# Patient Record
Sex: FEMALE | Race: WHITE | NOT HISPANIC OR LATINO
[De-identification: names, ages, dates, MRNs, and addresses within clinical notes are randomized per-mention and may not be internally consistent; named-entity substitution may affect disease eponyms.]

---

## 2017-11-15 ENCOUNTER — APPOINTMENT (OUTPATIENT)
Dept: COLORECTAL SURGERY | Facility: CLINIC | Age: 62
End: 2017-11-15
Payer: COMMERCIAL

## 2017-11-15 VITALS
BODY MASS INDEX: 45.04 KG/M2 | DIASTOLIC BLOOD PRESSURE: 79 MMHG | HEART RATE: 80 BPM | WEIGHT: 287 LBS | SYSTOLIC BLOOD PRESSURE: 131 MMHG | RESPIRATION RATE: 14 BRPM | HEIGHT: 67 IN

## 2017-11-15 DIAGNOSIS — Z87.891 PERSONAL HISTORY OF NICOTINE DEPENDENCE: ICD-10-CM

## 2017-11-15 DIAGNOSIS — Z85.828 PERSONAL HISTORY OF OTHER MALIGNANT NEOPLASM OF SKIN: ICD-10-CM

## 2017-11-15 DIAGNOSIS — Z86.39 PERSONAL HISTORY OF OTHER ENDOCRINE, NUTRITIONAL AND METABOLIC DISEASE: ICD-10-CM

## 2017-11-15 DIAGNOSIS — Z83.79 FAMILY HISTORY OF OTHER DISEASES OF THE DIGESTIVE SYSTEM: ICD-10-CM

## 2017-11-15 DIAGNOSIS — Z80.0 FAMILY HISTORY OF MALIGNANT NEOPLASM OF DIGESTIVE ORGANS: ICD-10-CM

## 2017-11-15 DIAGNOSIS — Z86.010 PERSONAL HISTORY OF COLONIC POLYPS: ICD-10-CM

## 2017-11-15 DIAGNOSIS — Z86.79 PERSONAL HISTORY OF OTHER DISEASES OF THE CIRCULATORY SYSTEM: ICD-10-CM

## 2017-11-15 DIAGNOSIS — Z80.49 FAMILY HISTORY OF MALIGNANT NEOPLASM OF OTHER GENITAL ORGANS: ICD-10-CM

## 2017-11-15 PROCEDURE — 99244 OFF/OP CNSLTJ NEW/EST MOD 40: CPT

## 2017-11-15 RX ORDER — POLYETHYLENE GLYCOL 3350, SODIUM SULFATE, SODIUM CHLORIDE, POTASSIUM CHLORIDE, ASCORBIC ACID, SODIUM ASCORBATE 7.5-2.691G
100 KIT ORAL
Qty: 1 | Refills: 0 | Status: ACTIVE | COMMUNITY
Start: 2017-11-15 | End: 1900-01-01

## 2017-11-16 PROBLEM — Z86.79 HISTORY OF HYPERTENSION: Status: RESOLVED | Noted: 2017-11-16 | Resolved: 2017-11-16

## 2017-11-16 PROBLEM — Z85.828 HISTORY OF MALIGNANT NEOPLASM OF SKIN: Status: RESOLVED | Noted: 2017-11-14 | Resolved: 2017-11-16

## 2017-11-16 PROBLEM — Z86.79 HISTORY OF VARICOSE VEINS: Status: RESOLVED | Noted: 2017-11-14 | Resolved: 2017-11-16

## 2017-11-16 PROBLEM — Z87.891 FORMER SMOKER: Status: ACTIVE | Noted: 2017-11-16

## 2017-11-16 PROBLEM — Z83.79 FAMILY HISTORY OF CROHN'S DISEASE: Status: ACTIVE | Noted: 2017-11-14

## 2017-11-16 PROBLEM — Z86.39 HISTORY OF HYPERLIPIDEMIA: Status: RESOLVED | Noted: 2017-11-14 | Resolved: 2017-11-16

## 2017-11-16 PROBLEM — Z80.49 FAMILY HISTORY OF MALIGNANT NEOPLASM OF FEMALE GENITAL ORGAN: Status: ACTIVE | Noted: 2017-11-16

## 2017-11-16 PROBLEM — Z86.39 HISTORY OF DIABETES MELLITUS: Status: RESOLVED | Noted: 2017-11-16 | Resolved: 2017-11-16

## 2017-11-16 PROBLEM — Z86.010 HISTORY OF COLON POLYPS: Status: ACTIVE | Noted: 2017-11-16

## 2017-11-16 PROBLEM — Z86.010 HISTORY OF COLON POLYPS: Status: RESOLVED | Noted: 2017-11-14 | Resolved: 2017-11-16

## 2017-11-28 ENCOUNTER — APPOINTMENT (OUTPATIENT)
Dept: COLORECTAL SURGERY | Facility: CLINIC | Age: 62
End: 2017-11-28
Payer: COMMERCIAL

## 2017-11-28 PROCEDURE — 45385 COLONOSCOPY W/LESION REMOVAL: CPT

## 2017-12-26 ENCOUNTER — TRANSCRIPTION ENCOUNTER (OUTPATIENT)
Age: 62
End: 2017-12-26

## 2018-02-22 ENCOUNTER — OTHER (OUTPATIENT)
Age: 63
End: 2018-02-22

## 2018-07-26 PROBLEM — Z80.0 FAMILY HISTORY OF COLON CANCER: Status: ACTIVE | Noted: 2017-11-14

## 2019-04-15 ENCOUNTER — TRANSCRIPTION ENCOUNTER (OUTPATIENT)
Age: 64
End: 2019-04-15

## 2020-09-01 ENCOUNTER — APPOINTMENT (OUTPATIENT)
Dept: COLORECTAL SURGERY | Facility: CLINIC | Age: 65
End: 2020-09-01
Payer: COMMERCIAL

## 2020-09-01 VITALS
HEART RATE: 103 BPM | SYSTOLIC BLOOD PRESSURE: 108 MMHG | DIASTOLIC BLOOD PRESSURE: 73 MMHG | WEIGHT: 240 LBS | BODY MASS INDEX: 37.67 KG/M2 | TEMPERATURE: 98.2 F | RESPIRATION RATE: 14 BRPM | HEIGHT: 67 IN

## 2020-09-01 DIAGNOSIS — R19.4 CHANGE IN BOWEL HABIT: ICD-10-CM

## 2020-09-01 DIAGNOSIS — H91.91 UNSPECIFIED HEARING LOSS, RIGHT EAR: ICD-10-CM

## 2020-09-01 DIAGNOSIS — Z86.69 PERSONAL HISTORY OF OTHER DISEASES OF THE NERVOUS SYSTEM AND SENSE ORGANS: ICD-10-CM

## 2020-09-01 DIAGNOSIS — Z00.00 ENCOUNTER FOR GENERAL ADULT MEDICAL EXAMINATION W/OUT ABNORMAL FINDINGS: ICD-10-CM

## 2020-09-01 PROCEDURE — 99214 OFFICE O/P EST MOD 30 MIN: CPT

## 2020-09-02 RX ORDER — LOSARTAN POTASSIUM 25 MG/1
25 TABLET, FILM COATED ORAL
Qty: 30 | Refills: 0 | Status: ACTIVE | COMMUNITY
Start: 2020-06-19

## 2020-09-02 RX ORDER — CLOTRIMAZOLE AND BETAMETHASONE DIPROPIONATE 10; .5 MG/G; MG/G
1-0.05 CREAM TOPICAL
Qty: 45 | Refills: 0 | Status: ACTIVE | COMMUNITY
Start: 2020-03-02

## 2020-09-02 RX ORDER — HYDROCORTISONE 25 MG/G
2.5 CREAM TOPICAL TWICE DAILY
Qty: 1 | Refills: 4 | Status: ACTIVE | COMMUNITY
Start: 2020-09-02 | End: 1900-01-01

## 2020-09-02 RX ORDER — ANTIFUNGAL POWDER MOISTURE ABSORBING 1.42 G/71G
2 POWDER TOPICAL
Qty: 71 | Refills: 0 | Status: ACTIVE | COMMUNITY
Start: 2020-02-22

## 2020-09-04 DIAGNOSIS — A04.72 ENTEROCOLITIS DUE TO CLOSTRIDIUM DIFFICILE, NOT SPECIFIED AS RECURRENT: ICD-10-CM

## 2020-09-04 PROBLEM — Z86.69 HISTORY OF HEARING LOSS: Status: RESOLVED | Noted: 2020-09-03 | Resolved: 2020-09-04

## 2020-09-04 PROBLEM — R19.4 CHANGE IN BOWEL HABITS: Status: ACTIVE | Noted: 2020-09-04

## 2020-09-04 PROBLEM — H91.91 HEARING LOSS OF RIGHT EAR, UNSPECIFIED HEARING LOSS TYPE: Status: RESOLVED | Noted: 2020-09-03 | Resolved: 2020-09-04

## 2020-09-04 NOTE — PHYSICAL EXAM
[Abdomen Tenderness] : ~T No ~M abdominal tenderness [Abdomen Masses] : No abdominal masses [JVD] : no jugular venous distention  [Tender] : nontender [Normal Heart Sounds] : normal heart sounds [Normal Breath Sounds] : Normal breath sounds [Normal Rate and Rhythm] : normal rate and rhythm [No Rash or Lesion] : No rash or lesion [Alert] : alert [Oriented to Place] : oriented to place [Oriented to Person] : oriented to person [Oriented to Time] : oriented to time [de-identified] : Looks well in no distress, of stated age. [Calm] : calm [de-identified] : pupils equal reactive to light normocephalic atraumatic. [de-identified] : moves all 4 extremities appropriately with 5 over 5 strength

## 2020-09-04 NOTE — REVIEW OF SYSTEMS
[Feeling Poorly] : feeling poorly [Abdominal Pain] : abdominal pain [As Noted in HPI] : as noted in HPI [Diarrhea] : diarrhea [Negative] : Endocrine

## 2020-09-04 NOTE — ASSESSMENT
[FreeTextEntry1] : 65-year-old female with history of colon polyps and family history of colon cancer With recent changes in bowel habits with increasing diarrhea and bleeding. Recommend colonoscopy, risk and benefits of colonoscopy have been discussed which include but not limited to bleeding, perforation, missing cancer or polyp occurring 5%. Recommend weight reduction high fiber diet Metamucil daily 81 mg aspirin daily to reduce colon polyp formation

## 2020-09-04 NOTE — CONSULT LETTER
[Consult Closing:] : Thank you very much for allowing me to participate in the care of this patient.  If you have any questions, please do not hesitate to contact me. [Please see my note below.] : Please see my note below.

## 2020-09-04 NOTE — HISTORY OF PRESENT ILLNESS
[FreeTextEntry1] : 65-year-old female with previous history of colon polyps and strong family history of colon cancer here With new complaints or changes in bowel habits with diarrhea and bleeding. Symptoms have been worsening

## 2020-09-14 RX ORDER — METRONIDAZOLE 500 MG/1
500 TABLET ORAL
Qty: 30 | Refills: 0 | Status: ACTIVE | COMMUNITY
Start: 2020-09-04 | End: 1900-01-01

## 2020-10-06 ENCOUNTER — APPOINTMENT (OUTPATIENT)
Dept: COLORECTAL SURGERY | Facility: CLINIC | Age: 65
End: 2020-10-06

## 2020-10-27 ENCOUNTER — APPOINTMENT (OUTPATIENT)
Dept: COLORECTAL SURGERY | Facility: CLINIC | Age: 65
End: 2020-10-27

## 2021-03-24 ENCOUNTER — OUTPATIENT (OUTPATIENT)
Dept: OUTPATIENT SERVICES | Facility: HOSPITAL | Age: 66
LOS: 1 days | End: 2021-03-24
Payer: COMMERCIAL

## 2021-03-24 ENCOUNTER — APPOINTMENT (OUTPATIENT)
Dept: CT IMAGING | Facility: CLINIC | Age: 66
End: 2021-03-24
Payer: COMMERCIAL

## 2021-03-24 DIAGNOSIS — Z00.8 ENCOUNTER FOR OTHER GENERAL EXAMINATION: ICD-10-CM

## 2021-03-24 PROCEDURE — 74261 CT COLONOGRAPHY DX: CPT

## 2021-03-24 PROCEDURE — 74261 CT COLONOGRAPHY DX: CPT | Mod: 26

## 2021-11-30 ENCOUNTER — APPOINTMENT (OUTPATIENT)
Dept: OPHTHALMOLOGY | Facility: CLINIC | Age: 66
End: 2021-11-30
Payer: COMMERCIAL

## 2021-11-30 ENCOUNTER — NON-APPOINTMENT (OUTPATIENT)
Age: 66
End: 2021-11-30

## 2021-11-30 PROCEDURE — 99213 OFFICE O/P EST LOW 20 MIN: CPT

## 2022-01-11 ENCOUNTER — RESULT REVIEW (OUTPATIENT)
Age: 67
End: 2022-01-11

## 2022-05-25 ENCOUNTER — NON-APPOINTMENT (OUTPATIENT)
Age: 67
End: 2022-05-25

## 2022-05-26 ENCOUNTER — APPOINTMENT (OUTPATIENT)
Dept: OPHTHALMOLOGY | Facility: CLINIC | Age: 67
End: 2022-05-26
Payer: COMMERCIAL

## 2022-05-26 ENCOUNTER — NON-APPOINTMENT (OUTPATIENT)
Age: 67
End: 2022-05-26

## 2022-05-26 PROCEDURE — 92014 COMPRE OPH EXAM EST PT 1/>: CPT

## 2022-12-08 ENCOUNTER — NON-APPOINTMENT (OUTPATIENT)
Age: 67
End: 2022-12-08

## 2022-12-08 ENCOUNTER — APPOINTMENT (OUTPATIENT)
Dept: OPHTHALMOLOGY | Facility: CLINIC | Age: 67
End: 2022-12-08

## 2022-12-08 PROCEDURE — 99213 OFFICE O/P EST LOW 20 MIN: CPT

## 2023-03-06 ENCOUNTER — NON-APPOINTMENT (OUTPATIENT)
Age: 68
End: 2023-03-06

## 2023-03-06 ENCOUNTER — APPOINTMENT (OUTPATIENT)
Dept: OPHTHALMOLOGY | Facility: CLINIC | Age: 68
End: 2023-03-06
Payer: MEDICARE

## 2023-03-06 PROCEDURE — 92012 INTRM OPH EXAM EST PATIENT: CPT

## 2023-08-03 ENCOUNTER — APPOINTMENT (OUTPATIENT)
Dept: OPHTHALMOLOGY | Facility: CLINIC | Age: 68
End: 2023-08-03
Payer: MEDICARE

## 2023-08-03 ENCOUNTER — NON-APPOINTMENT (OUTPATIENT)
Age: 68
End: 2023-08-03

## 2023-08-03 PROCEDURE — 92014 COMPRE OPH EXAM EST PT 1/>: CPT

## 2023-11-09 ENCOUNTER — NON-APPOINTMENT (OUTPATIENT)
Age: 68
End: 2023-11-09

## 2023-11-10 ENCOUNTER — NON-APPOINTMENT (OUTPATIENT)
Age: 68
End: 2023-11-10

## 2023-11-10 ENCOUNTER — APPOINTMENT (OUTPATIENT)
Dept: COLORECTAL SURGERY | Facility: CLINIC | Age: 68
End: 2023-11-10
Payer: MEDICARE

## 2023-11-10 VITALS
RESPIRATION RATE: 14 BRPM | HEART RATE: 64 BPM | BODY MASS INDEX: 37.35 KG/M2 | SYSTOLIC BLOOD PRESSURE: 147 MMHG | OXYGEN SATURATION: 96 % | WEIGHT: 238 LBS | DIASTOLIC BLOOD PRESSURE: 85 MMHG | HEIGHT: 67 IN

## 2023-11-10 DIAGNOSIS — K50.111 CROHN'S DISEASE OF LARGE INTESTINE WITH RECTAL BLEEDING: ICD-10-CM

## 2023-11-10 PROCEDURE — 99205 OFFICE O/P NEW HI 60 MIN: CPT

## 2023-11-30 ENCOUNTER — NON-APPOINTMENT (OUTPATIENT)
Age: 68
End: 2023-11-30

## 2023-12-08 ENCOUNTER — APPOINTMENT (OUTPATIENT)
Dept: OPHTHALMOLOGY | Facility: CLINIC | Age: 68
End: 2023-12-08
Payer: MEDICARE

## 2023-12-08 ENCOUNTER — NON-APPOINTMENT (OUTPATIENT)
Age: 68
End: 2023-12-08

## 2023-12-08 PROCEDURE — 92012 INTRM OPH EXAM EST PATIENT: CPT

## 2024-06-07 ENCOUNTER — APPOINTMENT (OUTPATIENT)
Dept: OPHTHALMOLOGY | Facility: CLINIC | Age: 69
End: 2024-06-07
Payer: MEDICARE

## 2024-06-07 ENCOUNTER — NON-APPOINTMENT (OUTPATIENT)
Age: 69
End: 2024-06-07

## 2024-06-07 PROCEDURE — 92250 FUNDUS PHOTOGRAPHY W/I&R: CPT

## 2024-06-07 PROCEDURE — 92014 COMPRE OPH EXAM EST PT 1/>: CPT

## 2024-09-17 ENCOUNTER — APPOINTMENT (OUTPATIENT)
Dept: COLORECTAL SURGERY | Facility: CLINIC | Age: 69
End: 2024-09-17
Payer: MEDICARE

## 2024-09-17 VITALS
DIASTOLIC BLOOD PRESSURE: 84 MMHG | BODY MASS INDEX: 34.21 KG/M2 | SYSTOLIC BLOOD PRESSURE: 128 MMHG | HEART RATE: 86 BPM | WEIGHT: 218 LBS | HEIGHT: 67 IN | RESPIRATION RATE: 14 BRPM

## 2024-09-17 DIAGNOSIS — K50.111 CROHN'S DISEASE OF LARGE INTESTINE WITH RECTAL BLEEDING: ICD-10-CM

## 2024-09-17 DIAGNOSIS — K56.699 OTHER INTESTINAL OBSTRUCTION UNSPECIFIED AS TO PARTIAL VERSUS COMPLETE OBSTRUCTION: ICD-10-CM

## 2024-09-17 PROCEDURE — 99214 OFFICE O/P EST MOD 30 MIN: CPT

## 2024-09-18 ENCOUNTER — NON-APPOINTMENT (OUTPATIENT)
Age: 69
End: 2024-09-18

## 2024-09-18 PROBLEM — K56.699 COLON STRICTURE: Status: ACTIVE | Noted: 2024-09-18

## 2024-09-18 NOTE — ASSESSMENT
[FreeTextEntry1] : 69year-old female with extensive Crohn's disease of the colon and rectum with stricture of the sigmoid colon..  Recommend Gastrografin enema study and CT enterography to assess area of stricture and active Crohn's disease.  Discussed the increased risk of cancer in patients with inflammatory bowel disease and stricture and she may require surgical resection of that area of the colon.  Risk and benefits of the surgery have been discussed which include bleeding, infection, sepsis, multiorgan failure, inadvertent injury including hollow viscus, solid organ, ureter neurovascular and neurological structures, DVT PE, heart attack, stroke, hernias, recurrence, leakage of anastomosis require reoperation possible stoma and death.

## 2024-09-18 NOTE — PHYSICAL EXAM
[Abdomen Masses] : No abdominal masses [Abdomen Tenderness] : ~T No ~M abdominal tenderness [JVD] : no jugular venous distention  [Normal Breath Sounds] : Normal breath sounds [Normal Heart Sounds] : normal heart sounds [Normal Rate and Rhythm] : normal rate and rhythm [No Rash or Lesion] : No rash or lesion [Alert] : alert [Oriented to Person] : oriented to person [Oriented to Place] : oriented to place [Oriented to Time] : oriented to time [Calm] : calm [de-identified] : Obese [de-identified] : Looks chronically ill in no distress [de-identified] : Pupils equal reactive to light normocephalic atraumatic. [de-identified] : Moves all 4 extremities appropriately with 5 over 5 strength

## 2024-09-18 NOTE — HISTORY OF PRESENT ILLNESS
[FreeTextEntry1] : 69-year-old female with severe Crohn's disease of the colon and rectum has undergone extensive medical treatment with Dr. Donovan Denney, although her symptoms have improved with recent changes in medication recent colonoscopy by Dr. Donovan Denney demonstrated significant stricture of the sigmoid colon

## 2024-09-18 NOTE — REVIEW OF SYSTEMS
[Feeling Poorly] : feeling poorly [Feeling Tired] : feeling tired [Recent Weight Loss (___ Lbs)] : recent [unfilled] ~Ulb weight loss [As Noted in HPI] : as noted in HPI [Abdominal Pain] : abdominal pain [Diarrhea] : diarrhea [Melena] : meljose de jesus [Negative] : Heme/Lymph

## 2024-10-02 ENCOUNTER — NON-APPOINTMENT (OUTPATIENT)
Age: 69
End: 2024-10-02

## 2024-10-02 ENCOUNTER — OUTPATIENT (OUTPATIENT)
Dept: OUTPATIENT SERVICES | Facility: HOSPITAL | Age: 69
LOS: 1 days | End: 2024-10-02
Payer: MEDICARE

## 2024-10-02 DIAGNOSIS — R19.4 CHANGE IN BOWEL HABIT: ICD-10-CM

## 2024-10-02 PROCEDURE — 74270 X-RAY XM COLON 1CNTRST STD: CPT | Mod: 26

## 2024-10-02 PROCEDURE — 74270 X-RAY XM COLON 1CNTRST STD: CPT

## 2024-10-03 DIAGNOSIS — R19.4 CHANGE IN BOWEL HABIT: ICD-10-CM

## 2024-10-22 ENCOUNTER — APPOINTMENT (OUTPATIENT)
Dept: COLORECTAL SURGERY | Facility: CLINIC | Age: 69
End: 2024-10-22
Payer: MEDICARE

## 2024-10-22 VITALS
DIASTOLIC BLOOD PRESSURE: 89 MMHG | HEART RATE: 98 BPM | SYSTOLIC BLOOD PRESSURE: 133 MMHG | RESPIRATION RATE: 14 BRPM | WEIGHT: 217 LBS | BODY MASS INDEX: 34.06 KG/M2 | HEIGHT: 67 IN

## 2024-10-22 DIAGNOSIS — K50.111 CROHN'S DISEASE OF LARGE INTESTINE WITH RECTAL BLEEDING: ICD-10-CM

## 2024-10-22 DIAGNOSIS — E66.01 MORBID (SEVERE) OBESITY DUE TO EXCESS CALORIES: ICD-10-CM

## 2024-10-22 DIAGNOSIS — K56.699 OTHER INTESTINAL OBSTRUCTION UNSPECIFIED AS TO PARTIAL VERSUS COMPLETE OBSTRUCTION: ICD-10-CM

## 2024-10-22 PROCEDURE — 99215 OFFICE O/P EST HI 40 MIN: CPT

## 2024-10-22 RX ORDER — METRONIDAZOLE 500 MG/1
500 TABLET ORAL
Qty: 6 | Refills: 0 | Status: ACTIVE | COMMUNITY
Start: 2024-10-22 | End: 1900-01-01

## 2024-10-22 RX ORDER — NEOMYCIN SULFATE 500 MG/1
500 TABLET ORAL
Qty: 6 | Refills: 0 | Status: ACTIVE | COMMUNITY
Start: 2024-10-22 | End: 1900-01-01

## 2024-11-05 ENCOUNTER — OUTPATIENT (OUTPATIENT)
Dept: OUTPATIENT SERVICES | Facility: HOSPITAL | Age: 69
LOS: 1 days | End: 2024-11-05
Payer: MEDICARE

## 2024-11-05 VITALS
HEIGHT: 65 IN | OXYGEN SATURATION: 97 % | TEMPERATURE: 99 F | DIASTOLIC BLOOD PRESSURE: 71 MMHG | WEIGHT: 218.26 LBS | HEART RATE: 80 BPM | RESPIRATION RATE: 16 BRPM | SYSTOLIC BLOOD PRESSURE: 131 MMHG

## 2024-11-05 DIAGNOSIS — Z98.890 OTHER SPECIFIED POSTPROCEDURAL STATES: Chronic | ICD-10-CM

## 2024-11-05 DIAGNOSIS — Z01.818 ENCOUNTER FOR OTHER PREPROCEDURAL EXAMINATION: ICD-10-CM

## 2024-11-05 DIAGNOSIS — Z29.9 ENCOUNTER FOR PROPHYLACTIC MEASURES, UNSPECIFIED: ICD-10-CM

## 2024-11-05 LAB
A1C WITH ESTIMATED AVERAGE GLUCOSE RESULT: 5.6 % — SIGNIFICANT CHANGE UP (ref 4–5.6)
ABO RH CONFIRMATION: SIGNIFICANT CHANGE UP
ALBUMIN SERPL ELPH-MCNC: 3.5 G/DL — SIGNIFICANT CHANGE UP (ref 3.3–5)
ALP SERPL-CCNC: 72 U/L — SIGNIFICANT CHANGE UP (ref 40–120)
ALT FLD-CCNC: 16 U/L — SIGNIFICANT CHANGE UP (ref 12–78)
ANION GAP SERPL CALC-SCNC: 4 MMOL/L — LOW (ref 5–17)
APTT BLD: 32.6 SEC — SIGNIFICANT CHANGE UP (ref 24.5–35.6)
AST SERPL-CCNC: 16 U/L — SIGNIFICANT CHANGE UP (ref 15–37)
BASOPHILS # BLD AUTO: 0.02 K/UL — SIGNIFICANT CHANGE UP (ref 0–0.2)
BASOPHILS NFR BLD AUTO: 0.3 % — SIGNIFICANT CHANGE UP (ref 0–2)
BILIRUB DIRECT SERPL-MCNC: 0.4 MG/DL — HIGH (ref 0–0.3)
BILIRUB INDIRECT FLD-MCNC: 1.5 MG/DL — HIGH (ref 0.2–1)
BILIRUB SERPL-MCNC: 1.9 MG/DL — HIGH (ref 0.2–1.2)
BLD GP AB SCN SERPL QL: SIGNIFICANT CHANGE UP
BUN SERPL-MCNC: 20 MG/DL — SIGNIFICANT CHANGE UP (ref 7–23)
CALCIUM SERPL-MCNC: 9.7 MG/DL — SIGNIFICANT CHANGE UP (ref 8.5–10.1)
CEA SERPL-MCNC: <0.6 NG/ML — SIGNIFICANT CHANGE UP (ref 0–3.8)
CHLORIDE SERPL-SCNC: 106 MMOL/L — SIGNIFICANT CHANGE UP (ref 96–108)
CO2 SERPL-SCNC: 29 MMOL/L — SIGNIFICANT CHANGE UP (ref 22–31)
CREAT SERPL-MCNC: 0.89 MG/DL — SIGNIFICANT CHANGE UP (ref 0.5–1.3)
EGFR: 71 ML/MIN/1.73M2 — SIGNIFICANT CHANGE UP
EOSINOPHIL # BLD AUTO: 0.12 K/UL — SIGNIFICANT CHANGE UP (ref 0–0.5)
EOSINOPHIL NFR BLD AUTO: 1.7 % — SIGNIFICANT CHANGE UP (ref 0–6)
ESTIMATED AVERAGE GLUCOSE: 114 MG/DL — SIGNIFICANT CHANGE UP (ref 68–114)
GLUCOSE SERPL-MCNC: 118 MG/DL — HIGH (ref 70–99)
HCT VFR BLD CALC: 40.5 % — SIGNIFICANT CHANGE UP (ref 34.5–45)
HGB BLD-MCNC: 13.6 G/DL — SIGNIFICANT CHANGE UP (ref 11.5–15.5)
IMM GRANULOCYTES NFR BLD AUTO: 0.3 % — SIGNIFICANT CHANGE UP (ref 0–0.9)
INR BLD: 1.03 RATIO — SIGNIFICANT CHANGE UP (ref 0.85–1.16)
LYMPHOCYTES # BLD AUTO: 2.3 K/UL — SIGNIFICANT CHANGE UP (ref 1–3.3)
LYMPHOCYTES # BLD AUTO: 32.9 % — SIGNIFICANT CHANGE UP (ref 13–44)
MCHC RBC-ENTMCNC: 31.1 PG — SIGNIFICANT CHANGE UP (ref 27–34)
MCHC RBC-ENTMCNC: 33.6 G/DL — SIGNIFICANT CHANGE UP (ref 32–36)
MCV RBC AUTO: 92.5 FL — SIGNIFICANT CHANGE UP (ref 80–100)
MONOCYTES # BLD AUTO: 0.71 K/UL — SIGNIFICANT CHANGE UP (ref 0–0.9)
MONOCYTES NFR BLD AUTO: 10.2 % — SIGNIFICANT CHANGE UP (ref 2–14)
NEUTROPHILS # BLD AUTO: 3.82 K/UL — SIGNIFICANT CHANGE UP (ref 1.8–7.4)
NEUTROPHILS NFR BLD AUTO: 54.6 % — SIGNIFICANT CHANGE UP (ref 43–77)
PLATELET # BLD AUTO: 269 K/UL — SIGNIFICANT CHANGE UP (ref 150–400)
POTASSIUM SERPL-MCNC: 3.6 MMOL/L — SIGNIFICANT CHANGE UP (ref 3.5–5.3)
POTASSIUM SERPL-SCNC: 3.6 MMOL/L — SIGNIFICANT CHANGE UP (ref 3.5–5.3)
PROT SERPL-MCNC: 7.3 GM/DL — SIGNIFICANT CHANGE UP (ref 6–8.3)
PROTHROM AB SERPL-ACNC: 11.8 SEC — SIGNIFICANT CHANGE UP (ref 9.9–13.4)
RBC # BLD: 4.38 M/UL — SIGNIFICANT CHANGE UP (ref 3.8–5.2)
RBC # FLD: 12.3 % — SIGNIFICANT CHANGE UP (ref 10.3–14.5)
SODIUM SERPL-SCNC: 139 MMOL/L — SIGNIFICANT CHANGE UP (ref 135–145)
WBC # BLD: 6.99 K/UL — SIGNIFICANT CHANGE UP (ref 3.8–10.5)
WBC # FLD AUTO: 6.99 K/UL — SIGNIFICANT CHANGE UP (ref 3.8–10.5)

## 2024-11-05 PROCEDURE — 83036 HEMOGLOBIN GLYCOSYLATED A1C: CPT

## 2024-11-05 PROCEDURE — 82378 CARCINOEMBRYONIC ANTIGEN: CPT

## 2024-11-05 PROCEDURE — 36415 COLL VENOUS BLD VENIPUNCTURE: CPT

## 2024-11-05 PROCEDURE — 80048 BASIC METABOLIC PNL TOTAL CA: CPT

## 2024-11-05 PROCEDURE — 86901 BLOOD TYPING SEROLOGIC RH(D): CPT

## 2024-11-05 PROCEDURE — 85025 COMPLETE CBC W/AUTO DIFF WBC: CPT

## 2024-11-05 PROCEDURE — 86850 RBC ANTIBODY SCREEN: CPT

## 2024-11-05 PROCEDURE — 93005 ELECTROCARDIOGRAM TRACING: CPT

## 2024-11-05 PROCEDURE — 80076 HEPATIC FUNCTION PANEL: CPT

## 2024-11-05 PROCEDURE — 93010 ELECTROCARDIOGRAM REPORT: CPT

## 2024-11-05 PROCEDURE — 86900 BLOOD TYPING SEROLOGIC ABO: CPT

## 2024-11-05 PROCEDURE — 99214 OFFICE O/P EST MOD 30 MIN: CPT | Mod: 25

## 2024-11-05 PROCEDURE — 85610 PROTHROMBIN TIME: CPT

## 2024-11-05 PROCEDURE — 85730 THROMBOPLASTIN TIME PARTIAL: CPT

## 2024-11-05 NOTE — H&P PST ADULT - RESPIRATORY AND THORAX COMMENTS
former smoker, "spot on lung" noted on screening diagnostics--stable, follows with pulmonary yearly Dr Delcid

## 2024-11-05 NOTE — H&P PST ADULT - NSICDXFAMILYHX_GEN_ALL_CORE_FT
FAMILY HISTORY:  FH: Crohn's disease  FH: diabetes mellitus  FH: HTN (hypertension)    Father  Still living? No  Family history of CVA, Age at diagnosis: Age Unknown  FH: heart attack, Age at diagnosis: Age Unknown    Mother  Still living? No  Family history of peritoneal cancer, Age at diagnosis: Age Unknown

## 2024-11-05 NOTE — H&P PST ADULT - ASSESSMENT
69 y.o female scheduled for Laparoscopic possible Open Sigmoid Colon Resection, Mobilization of Splen Flexure, Rigid Proctosigmoidoscopy, possible Ostomy   Plan  1. Stop all NSAIDS, herbal supplements and vitamins for 7 days.  2. NPO at midnight.  3. Take the following medications --none-- with small sips of water on the morning of your procedure/surgery.  4. Use EZ sponges as directed  5. Labs, EKG as per surgeon  6. PMD D. [place]se[place] visit for optimization prior to surgery as per surgeon  7. Preprocedure education provided including Colon surgery instruction sheet     CAPRINI SCORE Version 2013    AGE RELATED RISK FACTORS                                                             [ ] Age 41-60 years             [1 point]  [x ] Age: 61-74 years            [2 points]                 [ ] Age 75 years or over     [3 points]             DISEASE RELATED RISK FACTORS                                                       [ ] Current swollen legs (pitting edema of any level)     [1 point]                     [ ] Varicose veins (visible, bulging vein, not spider veins or surgically removed veins)   [1 point]                                 [x ] BMI > 25 Kg/m2                       [1 point]  [ ] BMI > 40 kg/m2                       [1 point]                                 [ ] Serious infection (within past month, requiring hospitalization and IV antibiotics)    [1 point]                     [ ] Lung disease ( interstitial: COPD, emphysema, sarcoid, 9-11 illness, NOT asthma)       [1 point]                                                                          [ ] Acute myocardial infarction (within past month)      [1 point]                  [ ] Congestive heart failure (episode within past month or taking CHF meds)                   [1 point]         [x ] Inflammatory bowel disease (Crohns disease or ulcerative colitis, not irritable bowel syndrome)   [1 point]                  [ ] Central venous access, PICC line or Port (within past month)     [2 points]                                                             [ ] Stroke (within past month)     [5 points]    [ ] Previous or present malignancy (includes melanoma but not basal cell carcinoma, each incidence of cancer scores 2 points-not metastases)  [2 points]                                                                                                                                                         HEMATOLOGY RELATED FACTORS                                                         [ ] History of DVT or PE (including superficial venous thrombosis)    [3 points]                    [ ] Positive family history for DVT or PE (includes first-, second-, and third-degree relatives)   [3 points]   [ ] Personal or family history of genetic thrombophilia (family history counts only if it has not been confirmed that patient does not have this genetic marker)                       [ ] Prothrombin 13244M mutation                   [3 points]                           [ ] Factor V Leiden                                               [3 points]            [ ] Antithrombin III deficiency                           [3 points]         [ ] Protein C & S deficiency                                [3 points]              [ ] Dysfibrinogenemia                                         [3 points]  [ ] Personal history of acquired thrombophilia                       [ ] Lupus anticoagulant                                       [3 points]                                                                  [ ] Anticardiolipin antibodies                             [3 points]              [ ] Antiphospholipid antibodies                         [3 points]                                                         [ ] High homocysteine in the blood                  [3 points]                                                    [ ] Myeloproliferative disorders (including thrombocytosis)    [3 points]            [ ] HIV                                                                     [3 points]                                                    [ ] Heparin induced thrombocytopenia            [3 points]                                        MOBILITY RELATED FACTORS  [ ] Bed rest or restricted mobility (inability to ambulate 30 feet continuously or removable leg brace) for less than 72 hours    [1 point]  [ ] Nonremovable plaster cast or mold that prevents calf muscle use   [2 points]  [ ] Bed bound  or restricted mobility for more than 72 hours                  [2 points]    GENDER SPECIFIC FACTORS  [ ] Pregnancy or had a baby within the last month   [1 point]  [ ] Hormone therapy  or oral contraception               [1 point]  [ ] Current use of estrogen-like drugs (raloxifine, tamoxifen, anastrozole, letrozole)                                [1 point]  [ ] History of unexplained stillborn infant, premature birth with toxemia or growth-restricted infant   [1 point]  [ ] Recurrent spontaneous abortions (3 or more)      [1 point]    OTHER RISK FACTORS                                         [ ] Current smoker (includes vaping and smoking marijuana)      [1 point]  [ ] Diabetes requiring insulin     [1 point]                   [ ] Chemotherapy (includes methotrexate for rheumatoid arthritis, hydroxyurea for thrombocytosis)    [1 point]  [ ] Blood transfusion(s)               [1 point]    SURGERY RELATED RISK FACTORS  [ ]  section within the last month                    [1 point]  [ ] Minor surgery is planned (less than 45 minutes)     [1 point]  [ ] Past major surgery (longer than 45 minutes) within past month  [2 points]  [x ] Planned major surgery lasting more than 45 minutes (includes laparoscopic and arthroscopic; do not add to the "5" for hip and knee replacement)    [2 points]  [ x] Length of surgery over 2 hours (includes anesthesia time; do not add to the "5" for hip and knee replacement)   [1 point]  [ ] Elective hip or knee joint replacement surgery         [5 points]                                               TRAUMA RELATED RISK FACTORS  [ ] Fracture of the hip, pelvis, or leg                       [5 points]  [ ] Spinal cord injury resulting in paralysis (within the past month)    [5 points]  [ ] Paralysis  (within the past month)                      [5 points]  [ ] Multiple trauma (within the past month)         [5 Points]    Total Score [ 7       ]    Total hip and total knee replacement:  Caprini score 9 or less: LOW risk  Caprini score 10 or highter: HIGH RISK    Caprini score 0-2: Low Risk, NO VTE prophylaxis required for most patients, encourage ambulation  Caprini score 3-6: Moderate Risk , pharmacologic VTE prophylaxis is indicated for most patients (in the absence of contraindications)  Caprini score 7 or higher: High risk, pharmocologic VTE prophylaxis indicated for most patients (in the absence of contraindications)

## 2024-11-05 NOTE — H&P PST ADULT - NSICDXPASTMEDICALHX_GEN_ALL_CORE_FT
PAST MEDICAL HISTORY:  Anxiety and depression     Arthritis     Crohn's disease     Deafness in right ear     Diverticulitis     Gilbert syndrome     Hiatal hernia     HTN (hypertension)     Hyperlipidemia     Left ovarian cyst     Lung nodule     Osteochondroma left knee--benign    Prediabetes     Renal cyst, right     Right acoustic neuroma     Skin cancer, basal cell     Stricture of sigmoid colon     Trigger finger, right

## 2024-11-05 NOTE — H&P PST ADULT - NSICDXPASTSURGICALHX_GEN_ALL_CORE_FT
PAST SURGICAL HISTORY:  H/O colonoscopy multiple    H/O left knee surgery 1975    History of dilatation and curettage     History of esophagogastroduodenoscopy (EGD)     S/P excision of acoustic neuroma Right---9/6/2019; Revision post op  wound infection 11/2019

## 2024-11-05 NOTE — H&P PST ADULT - HISTORY OF PRESENT ILLNESS
69 y.o WD, WN female presents to PST with hx of Crohn's disease dx about 4 1/2 yrs ago; suffering with abdominal pain, rectal bleeding and diarrhea. She had tried multiple biologics without success which has impacted her quality of life. Patient has recently started Enyvio infusions every 6 weeks which had been helping. She has been followed with colorectal, states diagnostics indicating a sigmoid colon stricture. Her hx is significant for HTN, Hyperlipidemia, right Acoustic neuroma--surgery 2019, and Anxiety/Depression. Patient has discussed with colorectal surgeon and scheduled for a Laparoscopic possible Open Sigmoid Colon Resection, Mobilization of Splen Flexure, Rigid Proctosigmoidoscopy, possible Ostomy

## 2024-11-06 ENCOUNTER — NON-APPOINTMENT (OUTPATIENT)
Age: 69
End: 2024-11-06

## 2024-11-06 DIAGNOSIS — Z01.818 ENCOUNTER FOR OTHER PREPROCEDURAL EXAMINATION: ICD-10-CM

## 2024-11-06 RX ORDER — SODIUM CHLORIDE 9 MG/ML
3 INJECTION, SOLUTION INTRAMUSCULAR; INTRAVENOUS; SUBCUTANEOUS EVERY 8 HOURS
Refills: 0 | Status: DISCONTINUED | OUTPATIENT
Start: 2024-11-11 | End: 2024-11-14

## 2024-11-11 ENCOUNTER — APPOINTMENT (OUTPATIENT)
Dept: COLORECTAL SURGERY | Facility: HOSPITAL | Age: 69
End: 2024-11-11

## 2024-11-11 ENCOUNTER — RESULT REVIEW (OUTPATIENT)
Age: 69
End: 2024-11-11

## 2024-11-11 ENCOUNTER — INPATIENT (INPATIENT)
Facility: HOSPITAL | Age: 69
LOS: 2 days | Discharge: HOME | DRG: 390 | End: 2024-11-14
Attending: COLON & RECTAL SURGERY | Admitting: COLON & RECTAL SURGERY
Payer: MEDICARE

## 2024-11-11 VITALS
OXYGEN SATURATION: 95 % | TEMPERATURE: 98 F | HEIGHT: 65 IN | SYSTOLIC BLOOD PRESSURE: 151 MMHG | WEIGHT: 214.29 LBS | RESPIRATION RATE: 16 BRPM | DIASTOLIC BLOOD PRESSURE: 73 MMHG | HEART RATE: 92 BPM

## 2024-11-11 DIAGNOSIS — Z98.890 OTHER SPECIFIED POSTPROCEDURAL STATES: Chronic | ICD-10-CM

## 2024-11-11 DIAGNOSIS — E66.01 MORBID (SEVERE) OBESITY DUE TO EXCESS CALORIES: ICD-10-CM

## 2024-11-11 DIAGNOSIS — K56.699 OTHER INTESTINAL OBSTRUCTION UNSPECIFIED AS TO PARTIAL VERSUS COMPLETE OBSTRUCTION: ICD-10-CM

## 2024-11-11 LAB
GLUCOSE BLDC GLUCOMTR-MCNC: 106 MG/DL — HIGH (ref 70–99)
GLUCOSE BLDC GLUCOMTR-MCNC: 127 MG/DL — HIGH (ref 70–99)
GLUCOSE BLDC GLUCOMTR-MCNC: 146 MG/DL — HIGH (ref 70–99)

## 2024-11-11 PROCEDURE — 97116 GAIT TRAINING THERAPY: CPT | Mod: GP

## 2024-11-11 PROCEDURE — 82962 GLUCOSE BLOOD TEST: CPT

## 2024-11-11 PROCEDURE — 88304 TISSUE EXAM BY PATHOLOGIST: CPT | Mod: 26

## 2024-11-11 PROCEDURE — 44650 REPAIR BOWEL FISTULA: CPT

## 2024-11-11 PROCEDURE — 88307 TISSUE EXAM BY PATHOLOGIST: CPT

## 2024-11-11 PROCEDURE — 44207 L COLECTOMY/COLOPROCTOSTOMY: CPT | Mod: AS

## 2024-11-11 PROCEDURE — 44207 L COLECTOMY/COLOPROCTOSTOMY: CPT

## 2024-11-11 PROCEDURE — 44650 REPAIR BOWEL FISTULA: CPT | Mod: AS

## 2024-11-11 PROCEDURE — 88304 TISSUE EXAM BY PATHOLOGIST: CPT

## 2024-11-11 PROCEDURE — 97162 PT EVAL MOD COMPLEX 30 MIN: CPT | Mod: GP

## 2024-11-11 PROCEDURE — 88307 TISSUE EXAM BY PATHOLOGIST: CPT | Mod: 26

## 2024-11-11 PROCEDURE — 85027 COMPLETE CBC AUTOMATED: CPT

## 2024-11-11 PROCEDURE — 44213 LAP MOBIL SPLENIC FL ADD-ON: CPT | Mod: AS

## 2024-11-11 PROCEDURE — C1889: CPT

## 2024-11-11 PROCEDURE — 83735 ASSAY OF MAGNESIUM: CPT

## 2024-11-11 PROCEDURE — 84100 ASSAY OF PHOSPHORUS: CPT

## 2024-11-11 PROCEDURE — C9290: CPT

## 2024-11-11 PROCEDURE — 36415 COLL VENOUS BLD VENIPUNCTURE: CPT

## 2024-11-11 PROCEDURE — 44213 LAP MOBIL SPLENIC FL ADD-ON: CPT

## 2024-11-11 PROCEDURE — 80048 BASIC METABOLIC PNL TOTAL CA: CPT

## 2024-11-11 PROCEDURE — 45300 PROCTOSIGMOIDOSCOPY DX: CPT

## 2024-11-11 RX ORDER — ROSUVASTATIN CALCIUM 10 MG
5 TABLET ORAL AT BEDTIME
Refills: 0 | Status: DISCONTINUED | OUTPATIENT
Start: 2024-11-11 | End: 2024-11-14

## 2024-11-11 RX ORDER — SERTRALINE HYDROCHLORIDE 50 MG/1
50 TABLET, FILM COATED ORAL AT BEDTIME
Refills: 0 | Status: DISCONTINUED | OUTPATIENT
Start: 2024-11-11 | End: 2024-11-14

## 2024-11-11 RX ORDER — VEDOLIZUMAB 300 MG/5ML
300 INJECTION, POWDER, LYOPHILIZED, FOR SOLUTION INTRAVENOUS
Refills: 0 | DISCHARGE

## 2024-11-11 RX ORDER — ACETAMINOPHEN 500 MG
1000 TABLET ORAL EVERY 6 HOURS
Refills: 0 | Status: COMPLETED | OUTPATIENT
Start: 2024-11-11 | End: 2024-11-12

## 2024-11-11 RX ORDER — HYDROMORPHONE HCL/0.9% NACL/PF 6 MG/30 ML
0.5 PATIENT CONTROLLED ANALGESIA SYRINGE INTRAVENOUS ONCE
Refills: 0 | Status: DISCONTINUED | OUTPATIENT
Start: 2024-11-11 | End: 2024-11-11

## 2024-11-11 RX ORDER — OXYCODONE HYDROCHLORIDE 30 MG/1
10 TABLET ORAL EVERY 4 HOURS
Refills: 0 | Status: DISCONTINUED | OUTPATIENT
Start: 2024-11-11 | End: 2024-11-14

## 2024-11-11 RX ORDER — HEPARIN SODIUM 10000 [USP'U]/ML
5000 INJECTION INTRAVENOUS; SUBCUTANEOUS ONCE
Refills: 0 | Status: COMPLETED | OUTPATIENT
Start: 2024-11-11 | End: 2024-11-11

## 2024-11-11 RX ORDER — ONDANSETRON HYDROCHLORIDE 2 MG/ML
4 INJECTION, SOLUTION INTRAMUSCULAR; INTRAVENOUS ONCE
Refills: 0 | Status: DISCONTINUED | OUTPATIENT
Start: 2024-11-11 | End: 2024-11-11

## 2024-11-11 RX ORDER — OXYCODONE HYDROCHLORIDE 30 MG/1
5 TABLET ORAL ONCE
Refills: 0 | Status: DISCONTINUED | OUTPATIENT
Start: 2024-11-11 | End: 2024-11-11

## 2024-11-11 RX ORDER — B-COMPLEX WITH VITAMIN C
1 VIAL (ML) INJECTION
Refills: 0 | DISCHARGE

## 2024-11-11 RX ORDER — B-COMPLEX WITH VITAMIN C
1 VIAL (ML) INJECTION DAILY
Refills: 0 | Status: DISCONTINUED | OUTPATIENT
Start: 2024-11-11 | End: 2024-11-14

## 2024-11-11 RX ORDER — ALVIMOPAN 12 MG/1
12 CAPSULE ORAL ONCE
Refills: 0 | Status: COMPLETED | OUTPATIENT
Start: 2024-11-11 | End: 2024-11-11

## 2024-11-11 RX ORDER — HYDROMORPHONE HCL/0.9% NACL/PF 6 MG/30 ML
0.5 PATIENT CONTROLLED ANALGESIA SYRINGE INTRAVENOUS
Refills: 0 | Status: DISCONTINUED | OUTPATIENT
Start: 2024-11-11 | End: 2024-11-11

## 2024-11-11 RX ORDER — ACETAMINOPHEN 500 MG
650 TABLET ORAL EVERY 6 HOURS
Refills: 0 | Status: DISCONTINUED | OUTPATIENT
Start: 2024-11-11 | End: 2024-11-14

## 2024-11-11 RX ORDER — CHOLECALCIFEROL (VITAMIN D3) 625 MCG
1 CAPSULE ORAL
Refills: 0 | DISCHARGE

## 2024-11-11 RX ORDER — AMLODIPINE BESYLATE 10 MG
5 TABLET ORAL AT BEDTIME
Refills: 0 | Status: DISCONTINUED | OUTPATIENT
Start: 2024-11-11 | End: 2024-11-14

## 2024-11-11 RX ORDER — SERTRALINE HYDROCHLORIDE 50 MG/1
1 TABLET, FILM COATED ORAL
Refills: 0 | DISCHARGE

## 2024-11-11 RX ORDER — CEFOTETAN DISODIUM 2 G
2 VIAL (EA) INJECTION ONCE
Refills: 0 | Status: COMPLETED | OUTPATIENT
Start: 2024-11-12 | End: 2024-11-12

## 2024-11-11 RX ORDER — ALVIMOPAN 12 MG/1
12 CAPSULE ORAL EVERY 12 HOURS
Refills: 0 | Status: DISCONTINUED | OUTPATIENT
Start: 2024-11-12 | End: 2024-11-13

## 2024-11-11 RX ORDER — AMLODIPINE BESYLATE 10 MG
1 TABLET ORAL
Refills: 0 | DISCHARGE

## 2024-11-11 RX ORDER — OXYCODONE HYDROCHLORIDE 30 MG/1
5 TABLET ORAL EVERY 4 HOURS
Refills: 0 | Status: DISCONTINUED | OUTPATIENT
Start: 2024-11-11 | End: 2024-11-14

## 2024-11-11 RX ORDER — OXYCODONE HYDROCHLORIDE 30 MG/1
10 TABLET ORAL ONCE
Refills: 0 | Status: DISCONTINUED | OUTPATIENT
Start: 2024-11-11 | End: 2024-11-11

## 2024-11-11 RX ORDER — ONDANSETRON HYDROCHLORIDE 2 MG/ML
4 INJECTION, SOLUTION INTRAMUSCULAR; INTRAVENOUS EVERY 6 HOURS
Refills: 0 | Status: DISCONTINUED | OUTPATIENT
Start: 2024-11-11 | End: 2024-11-14

## 2024-11-11 RX ORDER — ROSUVASTATIN CALCIUM 10 MG
1 TABLET ORAL
Refills: 0 | DISCHARGE

## 2024-11-11 RX ORDER — ENOXAPARIN SODIUM 40MG/0.4ML
40 SYRINGE (ML) SUBCUTANEOUS EVERY 24 HOURS
Refills: 0 | Status: DISCONTINUED | OUTPATIENT
Start: 2024-11-12 | End: 2024-11-14

## 2024-11-11 RX ADMIN — Medication 5 MILLIGRAM(S): at 21:32

## 2024-11-11 RX ADMIN — Medication 0.5 MILLIGRAM(S): at 18:00

## 2024-11-11 RX ADMIN — ALVIMOPAN 12 MILLIGRAM(S): 12 CAPSULE ORAL at 12:26

## 2024-11-11 RX ADMIN — Medication 0.5 MILLIGRAM(S): at 17:40

## 2024-11-11 RX ADMIN — SERTRALINE HYDROCHLORIDE 50 MILLIGRAM(S): 50 TABLET, FILM COATED ORAL at 21:32

## 2024-11-11 RX ADMIN — Medication 0.5 MILLIGRAM(S): at 18:15

## 2024-11-11 RX ADMIN — SODIUM CHLORIDE 3 MILLILITER(S): 9 INJECTION, SOLUTION INTRAMUSCULAR; INTRAVENOUS; SUBCUTANEOUS at 20:23

## 2024-11-11 RX ADMIN — Medication 0.5 MILLIGRAM(S): at 17:50

## 2024-11-11 RX ADMIN — HEPARIN SODIUM 5000 UNIT(S): 10000 INJECTION INTRAVENOUS; SUBCUTANEOUS at 12:26

## 2024-11-11 NOTE — BRIEF OPERATIVE NOTE - COMMENTS
I, MARILEE Pace, was present with Dr. Ramirez for the entirety of the procedure, assisting during all key portions of the procedure, including the  skin closure aspect. For further details, please refer to his operative dictation.

## 2024-11-11 NOTE — BRIEF OPERATIVE NOTE - OPERATION/FINDINGS
thickened sigmoid colon with crohns disease, entero-colonic and colo-colonic fistula. sigmoid also adherent to left adnexa.

## 2024-11-11 NOTE — BRIEF OPERATIVE NOTE - NSICDXBRIEFPROCEDURE_GEN_ALL_CORE_FT
PROCEDURES:  Hand assisted laparoscopic sigmoidectomy 11-Nov-2024 17:43:44  Mt Pace  Closure, fistula, enterocolic 11-Nov-2024 17:44:41  Mt Pace

## 2024-11-11 NOTE — ASU PREOP CHECKLIST - ISOLATION PRECAUTIONS
08/12/23 0125   Provider Notification   Provider Name/Title Dr. Johnson   Method of Notification Electronic Page   Request Evaluate - Remote   Notification Reason Other (Comment);Status Update     FYI pt complete and started pushing apox 5 min ago. Thanks!   none

## 2024-11-11 NOTE — PATIENT PROFILE ADULT - ARRIVAL FROM
Please let mom know I sent dexamethasone   2 tabs once daily x 2 days   If she is responding well to albuterol then hold off to start it.  If worsening start it.      Home

## 2024-11-11 NOTE — ASU PREOP CHECKLIST - CHLOROHEXIDINE WASH 3
In order to meet Medicare requirements, the clinical documentation must support the information cited in the admission order.  Please be sure to provide detailed and clear documentation about the following in the admitting note/history and physical:
09-Nov-2024

## 2024-11-12 LAB
ANION GAP SERPL CALC-SCNC: 6 MMOL/L — SIGNIFICANT CHANGE UP (ref 5–17)
BUN SERPL-MCNC: 15 MG/DL — SIGNIFICANT CHANGE UP (ref 7–23)
CALCIUM SERPL-MCNC: 9 MG/DL — SIGNIFICANT CHANGE UP (ref 8.5–10.1)
CHLORIDE SERPL-SCNC: 106 MMOL/L — SIGNIFICANT CHANGE UP (ref 96–108)
CO2 SERPL-SCNC: 25 MMOL/L — SIGNIFICANT CHANGE UP (ref 22–31)
CREAT SERPL-MCNC: 1 MG/DL — SIGNIFICANT CHANGE UP (ref 0.5–1.3)
EGFR: 62 ML/MIN/1.73M2 — SIGNIFICANT CHANGE UP
GLUCOSE BLDC GLUCOMTR-MCNC: 119 MG/DL — HIGH (ref 70–99)
GLUCOSE BLDC GLUCOMTR-MCNC: 119 MG/DL — HIGH (ref 70–99)
GLUCOSE BLDC GLUCOMTR-MCNC: 139 MG/DL — HIGH (ref 70–99)
GLUCOSE BLDC GLUCOMTR-MCNC: 157 MG/DL — HIGH (ref 70–99)
GLUCOSE SERPL-MCNC: 132 MG/DL — HIGH (ref 70–99)
HCT VFR BLD CALC: 35.1 % — SIGNIFICANT CHANGE UP (ref 34.5–45)
HGB BLD-MCNC: 11.9 G/DL — SIGNIFICANT CHANGE UP (ref 11.5–15.5)
MAGNESIUM SERPL-MCNC: 1.7 MG/DL — SIGNIFICANT CHANGE UP (ref 1.6–2.6)
MCHC RBC-ENTMCNC: 31.2 PG — SIGNIFICANT CHANGE UP (ref 27–34)
MCHC RBC-ENTMCNC: 33.9 G/DL — SIGNIFICANT CHANGE UP (ref 32–36)
MCV RBC AUTO: 91.9 FL — SIGNIFICANT CHANGE UP (ref 80–100)
PHOSPHATE SERPL-MCNC: 3.1 MG/DL — SIGNIFICANT CHANGE UP (ref 2.5–4.5)
PLATELET # BLD AUTO: 234 K/UL — SIGNIFICANT CHANGE UP (ref 150–400)
POTASSIUM SERPL-MCNC: 3.5 MMOL/L — SIGNIFICANT CHANGE UP (ref 3.5–5.3)
POTASSIUM SERPL-SCNC: 3.5 MMOL/L — SIGNIFICANT CHANGE UP (ref 3.5–5.3)
RBC # BLD: 3.82 M/UL — SIGNIFICANT CHANGE UP (ref 3.8–5.2)
RBC # FLD: 12.6 % — SIGNIFICANT CHANGE UP (ref 10.3–14.5)
SODIUM SERPL-SCNC: 137 MMOL/L — SIGNIFICANT CHANGE UP (ref 135–145)
WBC # BLD: 8.65 K/UL — SIGNIFICANT CHANGE UP (ref 3.8–10.5)
WBC # FLD AUTO: 8.65 K/UL — SIGNIFICANT CHANGE UP (ref 3.8–10.5)

## 2024-11-12 PROCEDURE — 99222 1ST HOSP IP/OBS MODERATE 55: CPT | Mod: FS

## 2024-11-12 RX ORDER — ACETAMINOPHEN 500 MG
1000 TABLET ORAL ONCE
Refills: 0 | Status: COMPLETED | OUTPATIENT
Start: 2024-11-13 | End: 2024-11-13

## 2024-11-12 RX ADMIN — SODIUM CHLORIDE 3 MILLILITER(S): 9 INJECTION, SOLUTION INTRAMUSCULAR; INTRAVENOUS; SUBCUTANEOUS at 13:53

## 2024-11-12 RX ADMIN — Medication 1000 MILLIGRAM(S): at 12:27

## 2024-11-12 RX ADMIN — ALVIMOPAN 12 MILLIGRAM(S): 12 CAPSULE ORAL at 10:26

## 2024-11-12 RX ADMIN — SODIUM CHLORIDE 3 MILLILITER(S): 9 INJECTION, SOLUTION INTRAMUSCULAR; INTRAVENOUS; SUBCUTANEOUS at 22:00

## 2024-11-12 RX ADMIN — Medication 1000 MILLIGRAM(S): at 18:39

## 2024-11-12 RX ADMIN — SERTRALINE HYDROCHLORIDE 50 MILLIGRAM(S): 50 TABLET, FILM COATED ORAL at 22:50

## 2024-11-12 RX ADMIN — Medication 400 MILLIGRAM(S): at 06:27

## 2024-11-12 RX ADMIN — Medication 5 MILLIGRAM(S): at 22:49

## 2024-11-12 RX ADMIN — Medication 400 MILLIGRAM(S): at 18:39

## 2024-11-12 RX ADMIN — ALVIMOPAN 12 MILLIGRAM(S): 12 CAPSULE ORAL at 22:49

## 2024-11-12 RX ADMIN — Medication 1 TABLET(S): at 10:26

## 2024-11-12 RX ADMIN — Medication 40 MILLIGRAM(S): at 01:06

## 2024-11-12 RX ADMIN — Medication 400 MILLIGRAM(S): at 12:27

## 2024-11-12 RX ADMIN — Medication 100 GRAM(S): at 06:26

## 2024-11-12 RX ADMIN — Medication 400 MILLIGRAM(S): at 01:06

## 2024-11-12 NOTE — PROGRESS NOTE ADULT - ASSESSMENT
69F w/ crohn's, s/p lap sigmoid and small bowel fistula takedown. POD1, unremarkable recovery    Plan:  - Pain control PRN  - Monitor vitals  - Reg diet  - Nausea control PRN  - replete electrolytes  - daily labs  - OOB/PT      Plan will be discussed with Colorectal surgical attendings and team members

## 2024-11-12 NOTE — PHYSICAL THERAPY INITIAL EVALUATION ADULT - CRITERIA FOR SKILLED THERAPEUTIC INTERVENTIONS
No Need for continued skilled acute care PT services at present, will d/c from acute care PT program at present. The pt should ambulate under nursing supervision./anticipated discharge recommendation

## 2024-11-12 NOTE — ANESTHESIA FOLLOW-UP NOTE - NSEVALATIONFT_GEN_ALL_CORE
Vital Signs Last 24 Hrs  T(C): 36.9 (12 Nov 2024 12:00), Max: 36.9 (12 Nov 2024 00:13)  T(F): 98.4 (12 Nov 2024 12:00), Max: 98.4 (12 Nov 2024 00:13)  HR: 72 (12 Nov 2024 12:00) (65 - 78)  BP: 122/63 (12 Nov 2024 12:00) (105/58 - 132/72)  BP(mean): 85 (12 Nov 2024 04:06) (77 - 85)  RR: 16 (12 Nov 2024 12:00) (12 - 19)  SpO2: 95% (12 Nov 2024 12:00) (94% - 100%)    Parameters below as of 12 Nov 2024 12:00  Patient On (Oxygen Delivery Method): room air

## 2024-11-12 NOTE — CONSULT NOTE ADULT - SUBJECTIVE AND OBJECTIVE BOX
PCP:  Dr. Johanny Bishop    CHIEF COMPLAINT: crohn's disease    HISTORY OF THE PRESENT ILLNESS: this is a 57 yo female with pmh: obesity, gilbert;s syndrome, diverticulitis, hiatal hernia, pre-dm, HTN, HLD, right ear acoustic neuroma, surgery in 2019, anxiety/depression and Crohn's disease/  Per pt she has tried numerous biologics without success and is now affecting her quality of life.  She recently started Entyvio infusions every 6 weeks, which seems to be working.  She has followed with Dr Ramirez, imaging + for sigmoid colon stricture.  Pt is seen on [address] [place] [address] S/P hand assisted lap sigmoidectomy. 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  We are consulted for medical management     PMH: as above  PSH: H/O left knee surgery, S/P excision of acoustic neuroma, History of dilatation and curettage, colonoscopy, Upper endo     FAMILY HISTORY:  Family history of peritoneal cancer (Mother)  Family history of CVA (Father)  FH: diabetes mellitus  FH: HTN (hypertension)  FH: heart attack (Father)  FH: Crohn's disease    Social History: former smoker 2ppd x 30 years, quit in 2009, no alcohol x 5 years, denies rec drug use    Allergies  vancomycin (Hives; Rash)  Levaquin (Other)  atorvastatin (Other)    Home Medications:  amLODIPine 5 mg oral tablet: 1 tab(s) orally once a day (at bedtime) (11 Nov 2024 11:45)  cholecalciferol 50 mcg (2000 intl units) oral tablet: 1 tab(s) orally once a day (11 Nov 2024 11:45)  Entyvio 300 mg intravenous injection: 300 unit(s) intravenously every 6 weeks Next infusion 11/6/2024 (11 Nov 2024 11:45)  Multiple Vitamins oral tablet: 1 tab(s) orally once a day (11 Nov 2024 11:45)  rosuvastatin 5 mg oral tablet: 1 tab(s) orally (11 Nov 2024 11:45)  sertraline 50 mg oral tablet: 1 tab(s) orally once a day (at bedtime) (11 Nov 2024 11:45)  Vitamin B12 1000 mg daily:  (11 Nov 2024 11:45)  Vitamin C 1000mg once daily:  (11 Nov 2024 11:45)    Vital Signs Last 24 Hrs  T(C): 36.6 (12 Nov 2024 08:00), Max: 36.9 (12 Nov 2024 00:13)  T(F): 97.9 (12 Nov 2024 08:00), Max: 98.4 (12 Nov 2024 00:13)  HR: 66 (12 Nov 2024 08:00) (65 - 92)  BP: 112/52 (12 Nov 2024 08:00) (105/58 - 151/73)  BP(mean): 85 (12 Nov 2024 04:06) (77 - 85)  RR: 18 (12 Nov 2024 08:00) (12 - 19)  SpO2: 100% (12 Nov 2024 08:00) (94% - 100%)    Parameters below as of 12 Nov 2024 08:00  Patient On (Oxygen Delivery Method): room air    REVIEW OF SYSTEMS:   All 10 systems reviewed in detailed and found to be negative with the exception of what has already been described above    MEDICATIONS  (STANDING):  acetaminophen   IVPB .. 1000 milliGRAM(s) IV Intermittent every 6 hours  alvimopan 12 milliGRAM(s) Oral every 12 hours  amLODIPine   Tablet 5 milliGRAM(s) Oral at bedtime  enoxaparin Injectable 40 milliGRAM(s) SubCutaneous every 24 hours  lactated ringers. 1000 milliLiter(s) (125 mL/Hr) IV Continuous <Continuous>  multivitamin 1 Tablet(s) Oral daily  rosuvastatin 5 milliGRAM(s) Oral at bedtime  sertraline 50 milliGRAM(s) Oral at bedtime  sodium chloride 0.9% lock flush 3 milliLiter(s) IV Push every 8 hours    MEDICATIONS  (PRN):  acetaminophen     Tablet .. 650 milliGRAM(s) Oral every 6 hours PRN Temp greater or equal to 38C (100.4F), Mild Pain (1 - 3)  ondansetron Injectable 4 milliGRAM(s) IV Push every 6 hours PRN Nausea and/or Vomiting  oxyCODONE    IR 5 milliGRAM(s) Oral every 4 hours PRN Moderate Pain (4 - 6)  oxyCODONE    IR 10 milliGRAM(s) Oral every 4 hours PRN Severe Pain (7 - 10)    PHYSICAL EXAM:    GENERAL: Comfortable, no acute distress   HEAD:  Normocephalic, atraumatic  EYES: EOMI, PERRLA  HEENT: Moist mucous membranes  NECK: Supple, No JVD  NERVOUS SYSTEM:  Alert & Oriented X3, Motor Strength 5/5 B/L upper and lower extremities  CHEST/LUNG: Clear to auscultation bilaterally  HEART: Regular rate and rhythm  ABDOMEN: Soft, obese, + post op tendernesNondistended, Bowel sounds present  GENITOURINARY: Voiding, no palpable bladder  EXTREMITIES:   No clubbing, cyanosis, or edema  MUSCULOSKELETAL- No muscle tenderness, no joint tenderness  SKIN-no rash            LABS:                              Troponin Trend:                         EKG:     RADIOLOGY STUDIES:      IMPRESSION: with above pmh. a/w:    pain control  IVF's  cm  Monitor I& O  Monitor Cbc, BMP  BGM per CRS protocol  Cont Abxs  NPO  Enterag    #VTE prophylaxis  hep sq  Venodynes  Amb               PCP:  Dr. Johanny Bishop    CHIEF COMPLAINT: crohn's disease    HISTORY OF THE PRESENT ILLNESS: this is a 68 yo female with pmh: obesity, Gibert's syndrome, diverticulitis, hiatal hernia, pre-dm, HTN, HLD, right ear acoustic neuroma, surgery in 2019, anxiety/depression and Crohn's disease/  Per pt she has tried numerous biologics without success and is now affecting her quality of life.  She recently started Entyvio infusions every 6 weeks, which seems to be working.  She has followed with Dr Ramirez, imaging + for sigmoid colon stricture.  Pt is seen on [facility] S/P hand assisted lap sigmoidectomy. 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  Pt is seen at bedside, awake, alert, having minimal post op pain, requesting tylenol, had light breakfast, schuyler well, no n/v,  no bowel function as of yet.  Denies any cp or sob.  We are consulted for medical management     PMH: as above  PSH: H/O left knee surgery, S/P excision of acoustic neuroma, History of dilatation and curettage, colonoscopy, Upper endo     FAMILY HISTORY:  Family history of peritoneal cancer (Mother)  Family history of CVA (Father)  FH: diabetes mellitus  FH: HTN (hypertension)  FH: heart attack (Father)  FH: Crohn's disease    Social History: former smoker 2ppd x 30 years, quit in , no alcohol x 5 years, denies rec drug use    Allergies  vancomycin (Hives; Rash)  Levaquin (Other)  atorvastatin (Other)    Home Medications:  amLODIPine 5 mg oral tablet: 1 tab(s) orally once a day (at bedtime) (2024 11:45)  cholecalciferol 50 mcg (2000 intl units) oral tablet: 1 tab(s) orally once a day (2024 11:45)  Entyvio 300 mg intravenous injection: 300 unit(s) intravenously every 6 weeks Next infusion 2024 (2024 11:45)  Multiple Vitamins oral tablet: 1 tab(s) orally once a day (2024 11:45)  rosuvastatin 5 mg oral tablet: 1 tab(s) orally (2024 11:45)  sertraline 50 mg oral tablet: 1 tab(s) orally once a day (at bedtime) (2024 11:45)  Vitamin B12 1000 mg daily:  (2024 11:45)  Vitamin C 1000mg once daily:  (2024 11:45)    Vital Signs Last 24 Hrs  T(C): 36.6 (2024 08:00), Max: 36.9 (2024 00:13)  T(F): 97.9 (2024 08:00), Max: 98.4 (2024 00:13)  HR: 66 (2024 08:00) (65 - 92)  BP: 112/52 (2024 08:00) (105/58 - 151/73)  BP(mean): 85 (2024 04:06) (77 - 85)  RR: 18 (2024 08:00) (12 - 19)  SpO2: 100% (2024 08:00) (94% - 100%)    Parameters below as of 2024 08:00  Patient On (Oxygen Delivery Method): room air    REVIEW OF SYSTEMS:   All 10 systems reviewed in detailed and found to be negative with the exception of what has already been described above    MEDICATIONS  (STANDING):  acetaminophen   IVPB .. 1000 milliGRAM(s) IV Intermittent every 6 hours  alvimopan 12 milliGRAM(s) Oral every 12 hours  amLODIPine   Tablet 5 milliGRAM(s) Oral at bedtime  enoxaparin Injectable 40 milliGRAM(s) SubCutaneous every 24 hours  lactated ringers. 1000 milliLiter(s) (125 mL/Hr) IV Continuous <Continuous>  multivitamin 1 Tablet(s) Oral daily  rosuvastatin 5 milliGRAM(s) Oral at bedtime  sertraline 50 milliGRAM(s) Oral at bedtime  sodium chloride 0.9% lock flush 3 milliLiter(s) IV Push every 8 hours    MEDICATIONS  (PRN):  acetaminophen     Tablet .. 650 milliGRAM(s) Oral every 6 hours PRN Temp greater or equal to 38C (100.4F), Mild Pain (1 - 3)  ondansetron Injectable 4 milliGRAM(s) IV Push every 6 hours PRN Nausea and/or Vomiting  oxyCODONE    IR 5 milliGRAM(s) Oral every 4 hours PRN Moderate Pain (4 - 6)  oxyCODONE    IR 10 milliGRAM(s) Oral every 4 hours PRN Severe Pain (7 - 10)    PHYSICAL EXAM:    GENERAL: Comfortable, no acute distress   HEAD:  Normocephalic, atraumatic  EYES: EOMI, PERRLA  HEENT: Moist mucous membranes  NECK: Supple, No JVD  NERVOUS SYSTEM:  Alert & Oriented X3, Motor Strength 5/5 B/L upper and lower extremities  CHEST/LUNG: Clear to auscultation bilaterally  HEART: Regular rate and rhythm  ABDOMEN: Soft, obese, + post op tenderness, Bowel sounds minimal, no Bowel function, NP seal intact, lap sites c/d/i  GENITOURINARY: cm  EXTREMITIES:   No clubbing, cyanosis, or edema  MUSCULOSKELETAL- No muscle tenderness, no joint tenderness  SKIN-no rash      LABS:                        11.9   8.65  )-----------( 234      ( 2024 09:06 )             35.1       11-12    137  |  106  |  15  ----------------------------<  132[H]  3.5   |  25  |  1.00    Ca    9.0      2024 09:06  Phos  3.1     11-12  Mg     1.7     11-12      CAPRINI SCORE Version 2013    AGE RELATED RISK FACTORS                                                             [ ] Age 41-60 years             [1 point]  [ x] Age: 61-74 years            [2 points]                 [ ] Age 75 years or over     [3 points]             DISEASE RELATED RISK FACTORS                                                       [ ] Current swollen legs (pitting edema of any level)     [1 point]                     [ ] Varicose veins (visible, bulging vein, not spider veins or surgically removed veins)   [1 point]                                 [x ] BMI > 25 Kg/m2                       [1 point]  [ ] BMI > 40 kg/m2                       [1 point]                                 [ ] Serious infection (within past month, requiring hospitalization and IV antibiotics)    [1 point]                     [ ] Lung disease ( interstitial: COPD, emphysema, sarcoid, 9-11 illness, NOT asthma)       [1 point]                                                                          [ ] Acute myocardial infarction (within past month)      [1 point]                  [ ] Congestive heart failure (episode within past month or taking CHF meds)                   [1 point]         [ x] Inflammatory bowel disease (Crohns disease or ulcerative colitis, not irritable bowel syndrome)   [1 point]                  [ ] Central venous access, PICC line or Port (within past month)     [2 points]                                                             [ ] Stroke (within past month)     [5 points]    [ ] Previous or present malignancy (includes melanoma but not basal cell carcinoma, each incidence of cancer scores 2 points-not metastases)  [2 points]                                                                                                                                                         HEMATOLOGY RELATED FACTORS                                                         [ ] History of DVT or PE (including superficial venous thrombosis)    [3 points]                    [ ] Positive family history for DVT or PE (includes first-, second-, and third-degree relatives)   [3 points]   [ ] Personal or family history of genetic thrombophilia (family history counts only if it has not been confirmed that patient does not have this genetic marker)                       [ ] Prothrombin 10289M mutation                   [3 points]                           [ ] Factor V Leiden                                               [3 points]            [ ] Antithrombin III deficiency                           [3 points]         [ ] Protein C & S deficiency                                [3 points]              [ ] Dysfibrinogenemia                                         [3 points]  [ ] Personal history of acquired thrombophilia                       [ ] Lupus anticoagulant                                       [3 points]                                                                  [ ] Anticardiolipin antibodies                             [3 points]              [ ] Antiphospholipid antibodies                         [3 points]                                                         [ ] High homocysteine in the blood                  [3 points]                                                    [ ] Myeloproliferative disorders (including thrombocytosis)    [3 points]            [ ] HIV                                                                     [3 points]                                                    [ ] Heparin induced thrombocytopenia            [3 points]                                        MOBILITY RELATED FACTORS  [ ] Bed rest or restricted mobility (inability to ambulate 30 feet continuously or removable leg brace) for less than 72 hours    [1 point]  [ ] Nonremovable plaster cast or mold that prevents calf muscle use   [2 points]  [ ] Bed bound  or restricted mobility for more than 72 hours                  [2 points]    GENDER SPECIFIC FACTORS  [ ] Pregnancy or had a baby within the last month   [1 point]  [ ] Hormone therapy  or oral contraception               [1 point]  [ ] Current use of estrogen-like drugs (raloxifine, tamoxifen, anastrozole, letrozole)                                [1 point]  [ ] History of unexplained stillborn infant, premature birth with toxemia or growth-restricted infant   [1 point]  [ ] Recurrent spontaneous abortions (3 or more)      [1 point]    OTHER RISK FACTORS                                         [ ] Current smoker (includes vaping and smoking marijuana)      [1 point]  [ ] Diabetes requiring insulin     [1 point]                   [ ] Chemotherapy (includes methotrexate for rheumatoid arthritis, hydroxyurea for thrombocytosis)    [1 point]  [ ] Blood transfusion(s)               [1 point]    SURGERY RELATED RISK FACTORS  [ ]  section within the last month                    [1 point]  [ ] Minor surgery is planned (less than 45 minutes)     [1 point]  [ ] Past major surgery (longer than 45 minutes) within past month  [2 points]  [x ] Planned major surgery lasting more than 45 minutes (includes laparoscopic and arthroscopic; do not add to the "5" for hip and knee replacement)    [2 points]  [x ] Length of surgery over 2 hours (includes anesthesia time; do not add to the "5" for hip and knee replacement)   [1 point]  [ ] Elective hip or knee joint replacement surgery         [5 points]                                               TRAUMA RELATED RISK FACTORS  [ ] Fracture of the hip, pelvis, or leg                       [5 points]  [ ] Spinal cord injury resulting in paralysis (within the past month)    [5 points]  [ ] Paralysis  (within the past month)                      [5 points]  [ ] Multiple trauma (within the past month)         [5 Points]    Total Score [    7    ]    Total hip and total knee replacement:  Caprini score 9 or less: LOW risk  Caprini score 10 or highter: HIGH RISK    Caprini score 0-2: Low Risk, NO VTE prophylaxis required for most patients, encourage ambulation  Caprini score 3-6: Moderate Risk , pharmacologic VTE prophylaxis is indicated for most patients (in the absence of contraindications)  Caprini score 7 or higher: High risk, pharmocologic VTE prophylaxis indicated for most patients (in the absence of contraindications)          IMPRESSION:  this is a 68 yo female with pmh: obesity, Gibert's syndrome, diverticulitis, hiatal hernia, pre-dm, HTN, HLD, right ear acoustic neuroma, surgery in 2019, anxiety/depression and Crohn's disease. Per pt she has tried numerous biologics without success and is now affecting her quality of life.  She recently started Entyvio infusions every 6 weeks, which seems to be working.  She has followed with Dr Ramirez, imaging + for sigmoid colon stricture.  Pt is seen on [facility] S/P hand assisted lap sigmoidectomy. 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     pain control with tylenol/oxycodone  IVF's  cm  wound care per CRS  Monitor I& O  Monitor Cbc, BMP  BGM per CRS protocol  Cont Abxs  diet per CRS  Enterag      #HTN  cont amlodipine    #HLD  continue statin    #Anxiety/depression  cont sertraline    #Pre-dm/hyperglycemia  bgm/ss    # Crohn's disease  f/u with surgery /GI for further Entyvio infusions    # Obesity   lifestyle/dietary modifications    #VTE prophylaxis  lovenox  Venodynes  Amb    Thank you for the consult, will follow

## 2024-11-12 NOTE — PHYSICAL THERAPY INITIAL EVALUATION ADULT - GENERAL OBSERVATIONS, REHAB EVAL
The pt was pleasant and cooperative, received on 2N, supine and eager to participate in PT evaluation. 1 Hutton Cath.

## 2024-11-12 NOTE — CONSULT NOTE ADULT - NS ATTEND AMEND GEN_ALL_CORE FT
Patient seen and examined with QUIN Dutton.  I was physically present for the key portions of the evaluation and management (E/M) service provided.  I agree with the above history, physical, and plan which I have reviewed and edited where appropriate.   this is a 68 yo female with pmh: obesity, Gibert's syndrome, diverticulitis, hiatal hernia, pre-dm, HTN, HLD, right ear acoustic neuroma, surgery in 2019, anxiety/depression and Crohn's disease. Per pt she has tried numerous biologics without success and is now affecting her quality of life.  She recently started Entyvio infusions every 6 weeks, which seems to be working.  She has followed with Dr Ramirez, imaging + for sigmoid colon stricture.  Pt is seen on [address] [place] [address] S/P hand assisted lap sigmoidectomy. 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     abd - soft + dressing in place    plan as per colorectal surgery   pain control with tylenol/oxycodone  IVF's  cm  wound care per CRS  Monitor I& O  Monitor Cbc, BMP  BGM per CRS protocol  Cont Abxs  diet per CRS  Enterag      #HTN  cont amlodipine    #HLD  continue statin    #Anxiety/depression  cont sertraline    #Pre-dm/hyperglycemia  bgm/ss    # Crohn's disease  f/u with surgery /GI for further Entyvio infusions    # Obesity   lifestyle/dietary modifications    #VTE prophylaxis  lovenox  Venodynes  Amb    Thank you for the consult, will follow

## 2024-11-12 NOTE — PHYSICAL THERAPY INITIAL EVALUATION ADULT - PERTINENT HX OF CURRENT PROBLEM, REHAB EVAL
ERP Patient    57 yo female with pmh: obesity, gilbert;s syndrome, diverticulitis, hiatal hernia, pre-dm, HTN, HLD, right ear acoustic neuroma, surgery in 2019, anxiety/depression and Crohn's disease/  Per pt she has tried numerous biologics without success and is now affecting her quality of life.  She recently started Entyvio infusions every 6 weeks, which seems to be working.  She has followed with Dr Ramirez, imaging + for sigmoid colon stricture.  Pt is seen on [address] [place] [address] S/P hand assisted lap sigmoidectomy. 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

## 2024-11-12 NOTE — PHYSICAL THERAPY INITIAL EVALUATION ADULT - DIAGNOSIS, PT EVAL
Patient Office Visit    ASSESSMENT AND PLAN:   1. Routine physical examination  Note: Please take 2000 IU of vitamin D daily     2. COPD, mild (HCC) - Dx'd via PFTs done in 3/2015  Note: Continue current inhalers.  She is trying to quit smoking.  She feels that she is having a little cough and Z-Mann has really helped her in the past.  Will refill it.  She is scheduled for another PFT and will be getting a CT scan done as well  - azithromycin 250 MG Oral Tab; Take 2 tablets (500 mg total) by mouth daily for 1 day, THEN 1 tablet (250 mg total) daily for 4 days.  Dispense: 6 tablet; Refill: 0    3. MDD (major depressive disorder), recurrent episode, moderate (HCC)  Note: On buspirone and Pristiq.  Takes clonazepam as needed.    4. Type 2 diabetes mellitus with diabetic neuropathy, without long-term current use of insulin (HCC)  Note: Continue Trulicity and metformin.  Overall well-controlled.  Doing better on gabapentin 600 mg as well.    5. CAD, multiple vessel  Note: Status post CABG. has not noticed much improvement with shortness of breath but is compliant with her medications.  She has a stress test coming up.  Continue baby aspirin and Plavix    6. Type 2 diabetes mellitus with hyperlipidemia (HCC)  Note: Continue statin and Trulicity    7. Primary hypertension  Note: Controlled on current regimen    8. Pulmonary nodule  Note: Has seen pulmonologist and will be getting a repeat CT scan done soon.    9. Migraine without aura and without status migrainosus, not intractable  Note: Continue follow-up with neurology.  On rizatriptan, Topamax and aimovig    10. KIRK (generalized anxiety disorder)  Note: Continue Seroquel and clonazepam as needed    11. Jamison's esophagus without dysplasia  Note: Continue omeprazole    12. Chronic bilateral low back pain without sciatica  Note: Takes Norco sparingly    13. SUMMER on CPAP  Note: Compliant    14. Encounter for tobacco use cessation counseling  Note: Discussed the side  69F w/ crohn's, s/p lap sigmoid and small bowel fistula takedown. POD1, unremarkable recovery effects and risks of smoking.  Patient understands and will try to quit on her own    15.  Anemia, unspecified type  Note: Given continued anemia with a normal ferritin level with thrombocytosis and leukocytosis will refer to hematology for an opinion  - Oncology/Hematology Referral - Greyson Padilla)    16. STOCKTON (dyspnea on exertion)  Note: Has pulmonology workup coming up.  Has a stress test coming up as well.  Referral to hematology provided to see if anemia is causing her symptoms.    Return to clinic after she is seen the specialists        Patient/Caregiver Education: Patient/Caregiver Education: There are no barriers to learning. Medical education done. Outcome: Patient verbalizes understanding. Patient is notified to call with any questions, complications, allergies, or worsening or changing symptoms.  Patient is to call with any side effects or complications from the treatments as a result of today.      Reviewed Past Medical History and   Patient Active Problem List   Diagnosis    Vitamin D deficiency    Jamison's esophagus    Tobacco use    Insomnia    COPD, mild (HCC) - Dx'd via PFTs done in 3/2015    Migraine without aura and without status migrainosus, not intractable    SUMMER on CPAP    MDD (major depressive disorder), recurrent episode, moderate (HCC)    Hypertension    Chronic pansinusitis    Mucinous cystadenoma of ovary, left    KIRK (generalized anxiety disorder)    History of pubovaginal sling    Absence of bladder continence    Vasomotor flushing    Mass of spine    Hx of motion sickness    Difficult intubation    Chronic low back pain without sciatica    Type 2 diabetes mellitus with diabetic neuropathy (HCC)    Pulmonary nodule    CAD, multiple vessel    S/P CABG x 2    Type 2 diabetes mellitus with hyperlipidemia (HCC)       No orders of the defined types were placed in this encounter.    Requested Prescriptions     Signed Prescriptions Disp Refills    azithromycin 250 MG Oral Tab 6 tablet 0      Sig: Take 2 tablets (500 mg total) by mouth daily for 1 day, THEN 1 tablet (250 mg total) daily for 4 days.         Rika Vinson DO  CC:  Chief Complaint   Patient presents with    Physical         HPI:   Soumya King is a 63-year-old female who presents for a physical    Shortness of breath: She continues to have shortness of breath despite having the bypass.  She has scheduled a CT scan of the chest and has scheduled a stress test as well.  She saw the cardiologist and everything has been stable from that standpoint.  Will be getting her PFTs done as well  Anemia/leukocytosis: Her mom used to have elevated white blood cell count but no anemia.  She has not noticed any blood in her stool or urine and has been taking the iron supplement daily  Diabetes/hypertension/hyperlipidemia: Stable on medicine    Past Medical History:   Diagnosis Date    Abdominal pain, other specified site 09/21/2011    groin    Acute bronchitis 09/21/2011    Allergic rhinitis 2010    Anxiety 2009    Arthritis     Back problem     Jamison esophagus     Chronic low back pain without sciatica     COPD (chronic obstructive pulmonary disease) (HCC)     Depression     Diabetes (HCC)     Difficult intubation     difficult to intubate with bladder surgery,instructed by anesthesia to communicate to future anesthesiologist. Small airway    Esophageal reflux     Essential hypertension     Don't remember    Fitting and adjustment of dental prosthetic device 04/26/2011    High blood pressure     History of 2019 novel coronavirus disease (COVID-19) 09/2020    No hospitalization,symptoms; Fever,fatigue ,body aches,headache no loss of taste or smell    Hx of motion sickness     Hyperlipidemia 2012    Mass of spine     Migraines     barometric pressure    Myocardial infarction (HCC) 12/21/23    Osteoarthritis     Other ill-defined conditions(799.89)     Jamison's    Pain in joint, forearm 04/26/2011    wrist    Pain in joint, pelvic region and  thigh 10/07/2011    hip    Pelvic mass     Personal history of urinary (tract) infection 12/09/2011    Sleep apnea     I don't date       Past Surgical History:   Procedure Laterality Date    CABG  12/27/23    COLONOSCOPY      COLONOSCOPY N/A 05/18/2022    Procedure: COLONOSCOPY AND ESOPHAGOGASTRODUODENOSCOPY;  Surgeon: Miguel Camargo MD;  Location:  ENDOSCOPY    HEART SURGERY  2024    double bypass    LAPAROSCOPIC  2007    sling operation for stress incontinence    LAPAROSCOPY,DIAGNOSTIC      ANGEL BIOPSY STEREO NODULE 2 SITE BILAT (CPT=19081/32429) Left 02/2010    BENIGN 2 SITES    ANGEL BIOPSY STEREO W/CALC 2 SITE LEFT (CPT=19081/82635)  02/2010    benign x 2    ANGEL STEREO-CLIP W/CALC 2 SITE LEFT  2010    OOPHORECTOMY Left 06/28/2019    OTHER  08/31/2022    RIGHT SACRAL SUBCUTANEOUS CYST EXCISION    OTHER SURGICAL HISTORY  2000, 2019    Bladder Sling, Large mass removed from left abdomen and ovar    TONSILLECTOMY      Was a child, have no idea    TUBAL LIGATION      UPPER GI ENDOSCOPY,EXAM         Social History:  Social History     Socioeconomic History    Marital status: Single   Tobacco Use    Smoking status: Some Days     Packs/day: 0.50     Years: 35.00     Additional pack years: 0.00     Total pack years: 17.50     Types: Cigarettes    Smokeless tobacco: Never    Tobacco comments:     Smoke about 2 cigarettes per day. But am smoking JUUL  noiw   Vaping Use    Vaping Use: Every day    Substances: Nicotine, Flavoring    Devices: Disposable, Pre-filled pod   Substance and Sexual Activity    Alcohol use: Not Currently     Comment: On occasion    Drug use: No    Sexual activity: Not Currently     Partners: Male   Other Topics Concern    Caffeine Concern Yes    Stress Concern Yes    Weight Concern Yes    Special Diet No    Exercise No    Seat Belt Yes     Social Determinants of Health     Food Insecurity: No Food Insecurity (12/21/2023)    Food Insecurity     Food Insecurity: Never true   Transportation Needs:  No Transportation Needs (12/21/2023)    Transportation Needs     Lack of Transportation: No   Housing Stability: Low Risk  (12/21/2023)    Housing Stability     Housing Instability: No     Family History:  Family History   Problem Relation Age of Onset    Arthritis Mother     Other (AMI) Mother     Other (diabetes mellitus) Mother     Diabetes Mother      Allergies:  Allergies   Allergen Reactions    Naprosyn [Naproxen] ANAPHYLAXIS     Has tolerated ibuprofen and IV toradol     Aspirin OTHER (SEE COMMENTS)     Difficulty swallowing due to  saavedra's esophagus     Current Meds:  Current Outpatient Medications on File Prior to Visit   Medication Sig Dispense Refill    albuterol (2.5 MG/3ML) 0.083% Inhalation Nebu Soln Take 3 mL (2.5 mg total) by nebulization every 6 (six) hours as needed for Wheezing. 50 mL 0    Ferrous Sulfate Dried (HIGH POTENCY IRON) 65 MG Oral Tab       Dulaglutide (TRULICITY) 0.75 MG/0.5ML Subcutaneous Solution Pen-injector Inject 0.75 mg into the skin once a week. 2 mL 0    Erenumab-aooe (AIMOVIG) 140 MG/ML Subcutaneous Solution Auto-injector Inject 1 mL (140 mg total) into the skin every 30 (thirty) days. 1 mL 4    HYDROcodone-acetaminophen 5-325 MG Oral Tab Take 1 tablet by mouth every 8 (eight) hours as needed for Pain.      umeclidinium bromide (INCRUSE ELLIPTA) 62.5 MCG/ACT Inhalation Aerosol Powder, Breath Activated Inhale 1 puff into the lungs daily. 3 each 0    clopidogrel 75 MG Oral Tab Take 1 tablet (75 mg total) by mouth daily.      aspirin 81 MG Oral Tab EC Take 1 tablet (81 mg total) by mouth daily.      atorvastatin 80 MG Oral Tab Take 1 tablet (80 mg total) by mouth nightly. 90 tablet 1    metFORMIN 500 MG Oral Tab Take 1 tablet (500 mg total) by mouth 2 (two) times daily. 180 tablet 1    furosemide 20 MG Oral Tab Take 1 tablet (20 mg total) by mouth daily. 7 tablet 0    clonazePAM 0.5 MG Oral Tab Take 1 tablet (0.5 mg total) by mouth 2 (two) times daily. 12 tablet 0    topiramate  50 MG Oral Tab TAKE ONE TABLET BY MOUTH EVERY MORNING AND TWO TABLETS EVERY EVENING 270 tablet 0    busPIRone 10 MG Oral Tab Take 1 tablet (10 mg total) by mouth daily.      Metoclopramide HCl 5 MG/5ML Oral Solution Take 5 mL (5 mg total) by mouth 3 (three) times daily before meals. 473 mL 0    cyclobenzaprine 10 MG Oral Tab Take 1 tablet (10 mg total) by mouth every 12 (twelve) hours as needed for Muscle spasms. 60 tablet 1    diclofenac 75 MG Oral Tab EC Take 1 tablet (75 mg total) by mouth 2 (two) times daily. 180 tablet 3    Omeprazole 40 MG Oral Capsule Delayed Release Take 1 capsule (40 mg total) by mouth 2 (two) times daily. 180 capsule 1    metoprolol succinate ER 50 MG Oral Tablet 24 Hr Take 1 tablet (50 mg total) by mouth daily. 90 tablet 1    Rizatriptan Benzoate 10 MG Oral Tab Take 1 tablet (10 mg total) by mouth as needed for Migraine. 10 tablet 5    Lancets (ONETOUCH DELICA PLUS [id number]) Does not apply Misc 1 lancet  by Finger stick route 3 (three) times daily. 300 each 1    albuterol 108 (90 Base) MCG/ACT Inhalation Aero Soln Inhale 2 puffs into the lungs every 4 (four) hours as needed. 18 g 2    Glucose Blood (ONETOUCH ULTRA) In Vitro Strip Tests 3 x daily 300 each 1    traMADol 50 MG Oral Tab Take 1-2 tablets ( mg total) by mouth every 6 (six) hours as needed for Pain. 120 tablet 2    estradiol (ESTRACE) 0.1 MG/GM Vaginal Cream Apply 1/2 gram vaginally 2-3 times per week. 42.5 g 3    Mirabegron ER (MYRBETRIQ) 50 MG Oral Tablet 24 Hr Take 1 tablet (50 mg total) by mouth daily. 30 tablet 11    lidocaine 5 % External Ointment Apply 1 Application. topically 3 (three) times daily as needed. 240 g 1    Multiple Vitamins-Minerals (ONE-A-DAY WOMENS 50+) Oral Tab Take 1 tablet by mouth daily.  0    QUEtiapine Fumarate  MG Oral Tablet 24 Hr Take 1 tablet (200 mg total) by mouth every evening. 30 tablet 0    Calcium Carb-Cholecalciferol 500-200 MG-UNIT Oral Tab Take 1 tablet by mouth daily.         gabapentin 600 MG Oral Tab Take 1 tablet (600 mg total) by mouth at bedtime. 180 tablet 0     No current facility-administered medications on file prior to visit.         REVIEW OF SYSTEMS   Constitutional: no fatigue normal energy no weight changes   HENT: normal sinuses and no mouth issues   Eyes: . normal vision no eye pain   Respiratory: normal respirations no cough   Cardiovascular: no CP, or palpitations   Gastrointestinal: normal bowels and no abd pains   Genitourinary:  normal urination no hematuria, no frequency   Musculoskeletal: no pains in arms/legs, normal range of motion   Skin: no rashes or skin lesions that are new   Neurological:  no weakness, no numbness, normal gait   Hematological:  no bruises or bleeding   Psychiatric/Behavioral: normal mood no anxiety normal behavior     /62 (BP Location: Right arm, Patient Position: Sitting, Cuff Size: adult)   Pulse 86   Temp 98.2 °F (36.8 °C) (Temporal)   Resp 16   Ht 5' 1\" (1.549 m)   Wt 128 lb (58.1 kg)   SpO2 96%   BMI 24.19 kg/m²     PHYSICAL EXAM:   Constitutional: Vital signs reviewed as noted, well developed, in no acute distress.   HENT: NCAT, bilateral ear canal and tympanic membrane appear normal  Eyes: pupils reactive bilaterally  Neck: No thyroidmegaly  Cardiovascular: nl s1 s2 no m/r/g  Pulmonary/Chest: CTA bilaterally with no wheezes  Abdominal: Soft NT normal Bowel sounds  Extremities: no pedal edema   Neurological:  no weakness in UE and LE, reflexes are normal  Skin: no rashes or bruises on visualized skin, not undressed   Psychiatric:normal mood

## 2024-11-13 ENCOUNTER — TRANSCRIPTION ENCOUNTER (OUTPATIENT)
Age: 69
End: 2024-11-13

## 2024-11-13 LAB
ANION GAP SERPL CALC-SCNC: 4 MMOL/L — LOW (ref 5–17)
BUN SERPL-MCNC: 13 MG/DL — SIGNIFICANT CHANGE UP (ref 7–23)
CALCIUM SERPL-MCNC: 9.1 MG/DL — SIGNIFICANT CHANGE UP (ref 8.5–10.1)
CHLORIDE SERPL-SCNC: 109 MMOL/L — HIGH (ref 96–108)
CO2 SERPL-SCNC: 30 MMOL/L — SIGNIFICANT CHANGE UP (ref 22–31)
CREAT SERPL-MCNC: 0.76 MG/DL — SIGNIFICANT CHANGE UP (ref 0.5–1.3)
EGFR: 86 ML/MIN/1.73M2 — SIGNIFICANT CHANGE UP
GLUCOSE BLDC GLUCOMTR-MCNC: 111 MG/DL — HIGH (ref 70–99)
GLUCOSE BLDC GLUCOMTR-MCNC: 97 MG/DL — SIGNIFICANT CHANGE UP (ref 70–99)
GLUCOSE SERPL-MCNC: 90 MG/DL — SIGNIFICANT CHANGE UP (ref 70–99)
HCT VFR BLD CALC: 35.4 % — SIGNIFICANT CHANGE UP (ref 34.5–45)
HCT VFR BLD CALC: 35.8 % — SIGNIFICANT CHANGE UP (ref 34.5–45)
HGB BLD-MCNC: 12 G/DL — SIGNIFICANT CHANGE UP (ref 11.5–15.5)
HGB BLD-MCNC: 12 G/DL — SIGNIFICANT CHANGE UP (ref 11.5–15.5)
MAGNESIUM SERPL-MCNC: 1.8 MG/DL — SIGNIFICANT CHANGE UP (ref 1.6–2.6)
MCHC RBC-ENTMCNC: 31.2 PG — SIGNIFICANT CHANGE UP (ref 27–34)
MCHC RBC-ENTMCNC: 31.7 PG — SIGNIFICANT CHANGE UP (ref 27–34)
MCHC RBC-ENTMCNC: 33.5 G/DL — SIGNIFICANT CHANGE UP (ref 32–36)
MCHC RBC-ENTMCNC: 33.9 G/DL — SIGNIFICANT CHANGE UP (ref 32–36)
MCV RBC AUTO: 93 FL — SIGNIFICANT CHANGE UP (ref 80–100)
MCV RBC AUTO: 93.7 FL — SIGNIFICANT CHANGE UP (ref 80–100)
PHOSPHATE SERPL-MCNC: 1.5 MG/DL — LOW (ref 2.5–4.5)
PLATELET # BLD AUTO: 227 K/UL — SIGNIFICANT CHANGE UP (ref 150–400)
PLATELET # BLD AUTO: 244 K/UL — SIGNIFICANT CHANGE UP (ref 150–400)
POTASSIUM SERPL-MCNC: 3.5 MMOL/L — SIGNIFICANT CHANGE UP (ref 3.5–5.3)
POTASSIUM SERPL-SCNC: 3.5 MMOL/L — SIGNIFICANT CHANGE UP (ref 3.5–5.3)
RBC # BLD: 3.78 M/UL — LOW (ref 3.8–5.2)
RBC # BLD: 3.85 M/UL — SIGNIFICANT CHANGE UP (ref 3.8–5.2)
RBC # FLD: 12.9 % — SIGNIFICANT CHANGE UP (ref 10.3–14.5)
RBC # FLD: 13 % — SIGNIFICANT CHANGE UP (ref 10.3–14.5)
SODIUM SERPL-SCNC: 143 MMOL/L — SIGNIFICANT CHANGE UP (ref 135–145)
WBC # BLD: 9.21 K/UL — SIGNIFICANT CHANGE UP (ref 3.8–10.5)
WBC # BLD: 9.52 K/UL — SIGNIFICANT CHANGE UP (ref 3.8–10.5)
WBC # FLD AUTO: 9.21 K/UL — SIGNIFICANT CHANGE UP (ref 3.8–10.5)
WBC # FLD AUTO: 9.52 K/UL — SIGNIFICANT CHANGE UP (ref 3.8–10.5)

## 2024-11-13 PROCEDURE — 99232 SBSQ HOSP IP/OBS MODERATE 35: CPT

## 2024-11-13 RX ORDER — DIBASIC SODIUM PHOSPHATE, MONOBASIC POTASSIUM PHOSPHATE AND MONOBASIC SODIUM PHOSPHATE 852; 155; 130 MG/1; MG/1; MG/1
1 TABLET ORAL
Refills: 0 | Status: COMPLETED | OUTPATIENT
Start: 2024-11-13 | End: 2024-11-14

## 2024-11-13 RX ORDER — DIBASIC SODIUM PHOSPHATE, MONOBASIC POTASSIUM PHOSPHATE AND MONOBASIC SODIUM PHOSPHATE 852; 155; 130 MG/1; MG/1; MG/1
1 TABLET ORAL EVERY 4 HOURS
Refills: 0 | Status: COMPLETED | OUTPATIENT
Start: 2024-11-13 | End: 2024-11-13

## 2024-11-13 RX ORDER — OXYCODONE AND ACETAMINOPHEN 7.5; 325 MG/1; MG/1
1 TABLET ORAL
Qty: 20 | Refills: 0
Start: 2024-11-13

## 2024-11-13 RX ORDER — ACETAMINOPHEN 500 MG
1000 TABLET ORAL ONCE
Refills: 0 | Status: COMPLETED | OUTPATIENT
Start: 2024-11-13 | End: 2024-11-13

## 2024-11-13 RX ADMIN — DIBASIC SODIUM PHOSPHATE, MONOBASIC POTASSIUM PHOSPHATE AND MONOBASIC SODIUM PHOSPHATE 1 TABLET(S): 852; 155; 130 TABLET ORAL at 21:02

## 2024-11-13 RX ADMIN — Medication 5 MILLIGRAM(S): at 21:02

## 2024-11-13 RX ADMIN — Medication 400 MILLIGRAM(S): at 08:15

## 2024-11-13 RX ADMIN — SODIUM CHLORIDE 3 MILLILITER(S): 9 INJECTION, SOLUTION INTRAMUSCULAR; INTRAVENOUS; SUBCUTANEOUS at 06:36

## 2024-11-13 RX ADMIN — Medication 1000 MILLIGRAM(S): at 08:30

## 2024-11-13 RX ADMIN — DIBASIC SODIUM PHOSPHATE, MONOBASIC POTASSIUM PHOSPHATE AND MONOBASIC SODIUM PHOSPHATE 1 TABLET(S): 852; 155; 130 TABLET ORAL at 11:34

## 2024-11-13 RX ADMIN — Medication 650 MILLIGRAM(S): at 21:02

## 2024-11-13 RX ADMIN — Medication 1 TABLET(S): at 11:33

## 2024-11-13 RX ADMIN — SERTRALINE HYDROCHLORIDE 50 MILLIGRAM(S): 50 TABLET, FILM COATED ORAL at 21:02

## 2024-11-13 RX ADMIN — SODIUM CHLORIDE 3 MILLILITER(S): 9 INJECTION, SOLUTION INTRAMUSCULAR; INTRAVENOUS; SUBCUTANEOUS at 13:45

## 2024-11-13 RX ADMIN — Medication 1000 MILLIGRAM(S): at 14:55

## 2024-11-13 RX ADMIN — Medication 1000 MILLIGRAM(S): at 00:40

## 2024-11-13 RX ADMIN — Medication 650 MILLIGRAM(S): at 21:32

## 2024-11-13 RX ADMIN — Medication 40 MILLIGRAM(S): at 00:26

## 2024-11-13 RX ADMIN — Medication 400 MILLIGRAM(S): at 00:25

## 2024-11-13 RX ADMIN — Medication 400 MILLIGRAM(S): at 14:40

## 2024-11-13 RX ADMIN — DIBASIC SODIUM PHOSPHATE, MONOBASIC POTASSIUM PHOSPHATE AND MONOBASIC SODIUM PHOSPHATE 1 TABLET(S): 852; 155; 130 TABLET ORAL at 16:39

## 2024-11-13 RX ADMIN — SODIUM CHLORIDE 3 MILLILITER(S): 9 INJECTION, SOLUTION INTRAMUSCULAR; INTRAVENOUS; SUBCUTANEOUS at 21:56

## 2024-11-13 NOTE — DISCHARGE NOTE PROVIDER - HOSPITAL COURSE
Patient presented electively for lap assisted sigmoid colon resection, Pt with a hx of Crohns disease with Small bowel adherent to sigmoid, no SB resection. Postop course with bloody loose BMs which improved, Hgb stable, pain controlled, stable Vital signs, ambulating and tolerating diet.

## 2024-11-13 NOTE — DISCHARGE NOTE PROVIDER - EXTENDED VTE YES NO FOR MLM ENOXAPARIN
Patient called in again later today and another encounter was put out about the results of MRI which has been forward to Jean Claude Huitron for review. Closing this encounter. ,

## 2024-11-13 NOTE — DISCHARGE NOTE PROVIDER - NSDCCPTREATMENT_GEN_ALL_CORE_FT
PRINCIPAL PROCEDURE  Procedure: Hand assisted laparoscopic sigmoidectomy  Findings and Treatment:       SECONDARY PROCEDURE  Procedure: Closure, fistula, enterocolic  Findings and Treatment:     
No

## 2024-11-13 NOTE — PROGRESS NOTE ADULT - ASSESSMENT
69F w/ crohn's, s/p lap sigmoid and small bowel fistula takedown. POD2      Plan:  - Pain control PRN  - Monitor vitals  - Reg diet  - Nausea control PRN  - replete electrolytes  - daily labs  - OOB/PT  - dispo today       Plan will be discussed with Colorectal surgical attendings and team members

## 2024-11-13 NOTE — DISCHARGE NOTE PROVIDER - NSDCFUSCHEDAPPT_GEN_ALL_CORE_FT
[facility]  [facility] [address] [facility]  Scheduled Appointment: 12/05/2024

## 2024-11-13 NOTE — DISCHARGE NOTE PROVIDER - NSDCCPCAREPLAN_GEN_ALL_CORE_FT
PRINCIPAL DISCHARGE DIAGNOSIS  Diagnosis: Crohn's disease, colon, with fistula  Assessment and Plan of Treatment:

## 2024-11-13 NOTE — DISCHARGE NOTE PROVIDER - NSDCMRMEDTOKEN_GEN_ALL_CORE_FT
amLODIPine 5 mg oral tablet: 1 tab(s) orally once a day (at bedtime)  cholecalciferol 50 mcg (2000 intl units) oral tablet: 1 tab(s) orally once a day  Entyvio 300 mg intravenous injection: 300 unit(s) intravenously every 6 weeks Next infusion 11/6/2024  Multiple Vitamins oral tablet: 1 tab(s) orally once a day  oxycodone-acetaminophen 5 mg-325 mg oral tablet: 1 tab(s) orally every 6 hours as needed for  moderate pain MDD: 004  rosuvastatin 5 mg oral tablet: 1 tab(s) orally  sertraline 50 mg oral tablet: 1 tab(s) orally once a day (at bedtime)  Vitamin B12 1000 mg daily:   Vitamin C 1000mg once daily:

## 2024-11-13 NOTE — DISCHARGE NOTE PROVIDER - CARE PROVIDER_API CALL
Jani Ramirez  Colon/Rectal Surgery  [address] [address] [facility], [place] [address]  Phone: [phone]  Fax: [phone]  Follow Up Time: 2 weeks

## 2024-11-14 ENCOUNTER — TRANSCRIPTION ENCOUNTER (OUTPATIENT)
Age: 69
End: 2024-11-14

## 2024-11-14 VITALS
SYSTOLIC BLOOD PRESSURE: 141 MMHG | DIASTOLIC BLOOD PRESSURE: 74 MMHG | TEMPERATURE: 99 F | HEART RATE: 85 BPM | RESPIRATION RATE: 18 BRPM | OXYGEN SATURATION: 93 %

## 2024-11-14 LAB
ANION GAP SERPL CALC-SCNC: 6 MMOL/L — SIGNIFICANT CHANGE UP (ref 5–17)
BUN SERPL-MCNC: 12 MG/DL — SIGNIFICANT CHANGE UP (ref 7–23)
CALCIUM SERPL-MCNC: 9.1 MG/DL — SIGNIFICANT CHANGE UP (ref 8.5–10.1)
CHLORIDE SERPL-SCNC: 109 MMOL/L — HIGH (ref 96–108)
CO2 SERPL-SCNC: 27 MMOL/L — SIGNIFICANT CHANGE UP (ref 22–31)
CREAT SERPL-MCNC: 0.57 MG/DL — SIGNIFICANT CHANGE UP (ref 0.5–1.3)
EGFR: 100 ML/MIN/1.73M2 — SIGNIFICANT CHANGE UP
GLUCOSE SERPL-MCNC: 101 MG/DL — HIGH (ref 70–99)
HCT VFR BLD CALC: 35.1 % — SIGNIFICANT CHANGE UP (ref 34.5–45)
HGB BLD-MCNC: 11.9 G/DL — SIGNIFICANT CHANGE UP (ref 11.5–15.5)
MAGNESIUM SERPL-MCNC: 1.7 MG/DL — SIGNIFICANT CHANGE UP (ref 1.6–2.6)
MCHC RBC-ENTMCNC: 31.7 PG — SIGNIFICANT CHANGE UP (ref 27–34)
MCHC RBC-ENTMCNC: 33.9 G/DL — SIGNIFICANT CHANGE UP (ref 32–36)
MCV RBC AUTO: 93.6 FL — SIGNIFICANT CHANGE UP (ref 80–100)
PHOSPHATE SERPL-MCNC: 2.4 MG/DL — LOW (ref 2.5–4.5)
PLATELET # BLD AUTO: 228 K/UL — SIGNIFICANT CHANGE UP (ref 150–400)
POTASSIUM SERPL-MCNC: 3.5 MMOL/L — SIGNIFICANT CHANGE UP (ref 3.5–5.3)
POTASSIUM SERPL-SCNC: 3.5 MMOL/L — SIGNIFICANT CHANGE UP (ref 3.5–5.3)
RBC # BLD: 3.75 M/UL — LOW (ref 3.8–5.2)
RBC # FLD: 13 % — SIGNIFICANT CHANGE UP (ref 10.3–14.5)
SODIUM SERPL-SCNC: 142 MMOL/L — SIGNIFICANT CHANGE UP (ref 135–145)
WBC # BLD: 8.03 K/UL — SIGNIFICANT CHANGE UP (ref 3.8–10.5)
WBC # FLD AUTO: 8.03 K/UL — SIGNIFICANT CHANGE UP (ref 3.8–10.5)

## 2024-11-14 PROCEDURE — 99232 SBSQ HOSP IP/OBS MODERATE 35: CPT

## 2024-11-14 RX ORDER — DIBASIC SODIUM PHOSPHATE, MONOBASIC POTASSIUM PHOSPHATE AND MONOBASIC SODIUM PHOSPHATE 852; 155; 130 MG/1; MG/1; MG/1
1 TABLET ORAL
Refills: 0 | Status: DISCONTINUED | OUTPATIENT
Start: 2024-11-14 | End: 2024-11-14

## 2024-11-14 RX ADMIN — Medication 650 MILLIGRAM(S): at 09:22

## 2024-11-14 RX ADMIN — SODIUM CHLORIDE 3 MILLILITER(S): 9 INJECTION, SOLUTION INTRAMUSCULAR; INTRAVENOUS; SUBCUTANEOUS at 05:21

## 2024-11-14 RX ADMIN — DIBASIC SODIUM PHOSPHATE, MONOBASIC POTASSIUM PHOSPHATE AND MONOBASIC SODIUM PHOSPHATE 1 TABLET(S): 852; 155; 130 TABLET ORAL at 08:34

## 2024-11-14 RX ADMIN — Medication 650 MILLIGRAM(S): at 08:34

## 2024-11-14 RX ADMIN — Medication 1 TABLET(S): at 08:34

## 2024-11-14 RX ADMIN — Medication 40 MILLIGRAM(S): at 00:08

## 2024-11-14 RX ADMIN — DIBASIC SODIUM PHOSPHATE, MONOBASIC POTASSIUM PHOSPHATE AND MONOBASIC SODIUM PHOSPHATE 1 TABLET(S): 852; 155; 130 TABLET ORAL at 13:38

## 2024-11-14 NOTE — DISCHARGE NOTE NURSING/CASE MANAGEMENT/SOCIAL WORK - FINANCIAL ASSISTANCE
[facility] provides services at a reduced cost to those who are determined to be eligible through [facility]’s financial assistance program. Information regarding [facility]’s financial assistance program can be found by going to [email or web address] or by calling [phone].

## 2024-11-14 NOTE — PROGRESS NOTE ADULT - ASSESSMENT
69F w/ crohn's, s/p lap sigmoid and small bowel fistula takedown. POD3      Plan:  - If CBC stable this AM will DC  - Pain control PRN  - Monitor vitals  - Reg diet  - Nausea control PRN  - replete electrolytes  - daily labs  - OOB/PT  - dispo today       Plan will be discussed with Colorectal surgical attendings and team members

## 2024-11-14 NOTE — PROGRESS NOTE ADULT - SUBJECTIVE AND OBJECTIVE BOX
PCP:  Dr. Johanny Bishop    CHIEF COMPLAINT: crohn's disease    HISTORY OF THE PRESENT ILLNESS: this is a 70 yo female with pmh: obesity, Gibert's syndrome, diverticulitis, hiatal hernia, pre-dm, HTN, HLD, right ear acoustic neuroma, surgery in 2019, anxiety/depression and Crohn's disease/  Per pt she has tried numerous biologics without success and is now affecting her quality of life.  She recently started Entyvio infusions every 6 weeks, which seems to be working.  She has followed with Dr Ramirez, imaging + for sigmoid colon stricture.  Pt is seen on 2 [place] S/P hand assisted lap sigmoidectomy. 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   We are consulted for medical management     Pt is seen up OOB, states she had several bloody stools yesterday and a little again this am, concerned about the bleeding in regards to her Crohn's disease. .  She voices concern about it happening at home.   Educated pt that having some bloody stools is normal after this type of surgery, and signs and symptoms that she should return to the ER for.  She voices understanding      REVIEW OF SYSTEMS:   All 10 systems reviewed in detailed and found to be negative with the exception of what has already been described above      Vital Signs Last 24 Hrs  T(C): 36.6 (2024 08:00), Max: 36.9 (2024 12:00)  T(F): 97.9 (2024 08:00), Max: 98.4 (2024 12:00)  HR: 66 (2024 08:00) (66 - 74)  BP: 131/60 (2024 08:00) (114/46 - 131/60)  BP(mean): --  RR: 16 (2024 08:00) (16 - 18)  SpO2: 95% (2024 08:00) (94% - 96%)    Parameters below as of 2024 08:00  Patient On (Oxygen Delivery Method): room air    MEDICATIONS  (STANDING):  amLODIPine   Tablet 5 milliGRAM(s) Oral at bedtime  enoxaparin Injectable 40 milliGRAM(s) SubCutaneous every 24 hours  multivitamin 1 Tablet(s) Oral daily  potassium phosphate / sodium phosphate Tablet (K-PHOS No. 2) 1 Tablet(s) Oral every 4 hours  rosuvastatin 5 milliGRAM(s) Oral at bedtime  sertraline 50 milliGRAM(s) Oral at bedtime  sodium chloride 0.9% lock flush 3 milliLiter(s) IV Push every 8 hours    MEDICATIONS  (PRN):  acetaminophen     Tablet .. 650 milliGRAM(s) Oral every 6 hours PRN Temp greater or equal to 38C (100.4F), Mild Pain (1 - 3)  ondansetron Injectable 4 milliGRAM(s) IV Push every 6 hours PRN Nausea and/or Vomiting  oxyCODONE    IR 10 milliGRAM(s) Oral every 4 hours PRN Severe Pain (7 - 10)  oxyCODONE    IR 5 milliGRAM(s) Oral every 4 hours PRN Moderate Pain (4 - 6)    PHYSICAL EXAM:    GENERAL: Comfortable, no acute distress   HEAD:  Normocephalic, atraumatic  EYES: EOMI, PERRLA  HEENT: Moist mucous membranes  NECK: Supple, No JVD  NERVOUS SYSTEM:  Alert & Oriented X3, Motor Strength 5/5 B/L upper and lower extremities  CHEST/LUNG: Clear to auscultation bilaterally  HEART: Regular rate and rhythm  ABDOMEN: Soft, obese, + post op tenderness, Bowel sounds minimal, no Bowel function, NP seal intact, lap sites c/d/i  GENITOURINARY: cm d/c'd, now voiding  EXTREMITIES:   No clubbing, cyanosis, or edema  MUSCULOSKELETAL- No muscle tenderness, no joint tenderness  SKIN-no rash      LABS:                                            12.0   9.21  )-----------( 244      ( 2024 08:23 )             35.8       11-13    143  |  109[H]  |  13  ----------------------------<  90  3.5   |  30  |  0.76    Ca    9.1      2024 08:23  Phos  1.5     11-13  Mg     1.8     11-13        CAPRINI SCORE Version 2013    AGE RELATED RISK FACTORS                                                             [ ] Age 41-60 years             [1 point]  [ x] Age: 61-74 years            [2 points]                 [ ] Age 75 years or over     [3 points]             DISEASE RELATED RISK FACTORS                                                       [ ] Current swollen legs (pitting edema of any level)     [1 point]                     [ ] Varicose veins (visible, bulging vein, not spider veins or surgically removed veins)   [1 point]                                 [x ] BMI > 25 Kg/m2                       [1 point]  [ ] BMI > 40 kg/m2                       [1 point]                                 [ ] Serious infection (within past month, requiring hospitalization and IV antibiotics)    [1 point]                     [ ] Lung disease ( interstitial: COPD, emphysema, sarcoid, 9-11 illness, NOT asthma)       [1 point]                                                                          [ ] Acute myocardial infarction (within past month)      [1 point]                  [ ] Congestive heart failure (episode within past month or taking CHF meds)                   [1 point]         [ x] Inflammatory bowel disease (Crohns disease or ulcerative colitis, not irritable bowel syndrome)   [1 point]                  [ ] Central venous access, PICC line or Port (within past month)     [2 points]                                                             [ ] Stroke (within past month)     [5 points]    [ ] Previous or present malignancy (includes melanoma but not basal cell carcinoma, each incidence of cancer scores 2 points-not metastases)  [2 points]                                                                                                                                                         HEMATOLOGY RELATED FACTORS                                                         [ ] History of DVT or PE (including superficial venous thrombosis)    [3 points]                    [ ] Positive family history for DVT or PE (includes first-, second-, and third-degree relatives)   [3 points]   [ ] Personal or family history of genetic thrombophilia (family history counts only if it has not been confirmed that patient does not have this genetic marker)                       [ ] Prothrombin 08547C mutation                   [3 points]                           [ ] Factor V Leiden                                               [3 points]            [ ] Antithrombin III deficiency                           [3 points]         [ ] Protein C & S deficiency                                [3 points]              [ ] Dysfibrinogenemia                                         [3 points]  [ ] Personal history of acquired thrombophilia                       [ ] Lupus anticoagulant                                       [3 points]                                                                  [ ] Anticardiolipin antibodies                             [3 points]              [ ] Antiphospholipid antibodies                         [3 points]                                                         [ ] High homocysteine in the blood                  [3 points]                                                    [ ] Myeloproliferative disorders (including thrombocytosis)    [3 points]            [ ] HIV                                                                     [3 points]                                                    [ ] Heparin induced thrombocytopenia            [3 points]                                        MOBILITY RELATED FACTORS  [ ] Bed rest or restricted mobility (inability to ambulate 30 feet continuously or removable leg brace) for less than 72 hours    [1 point]  [ ] Nonremovable plaster cast or mold that prevents calf muscle use   [2 points]  [ ] Bed bound  or restricted mobility for more than 72 hours                  [2 points]    GENDER SPECIFIC FACTORS  [ ] Pregnancy or had a baby within the last month   [1 point]  [ ] Hormone therapy  or oral contraception               [1 point]  [ ] Current use of estrogen-like drugs (raloxifine, tamoxifen, anastrozole, letrozole)                                [1 point]  [ ] History of unexplained stillborn infant, premature birth with toxemia or growth-restricted infant   [1 point]  [ ] Recurrent spontaneous abortions (3 or more)      [1 point]    OTHER RISK FACTORS                                         [ ] Current smoker (includes vaping and smoking marijuana)      [1 point]  [ ] Diabetes requiring insulin     [1 point]                   [ ] Chemotherapy (includes methotrexate for rheumatoid arthritis, hydroxyurea for thrombocytosis)    [1 point]  [ ] Blood transfusion(s)               [1 point]    SURGERY RELATED RISK FACTORS  [ ]  section within the last month                    [1 point]  [ ] Minor surgery is planned (less than 45 minutes)     [1 point]  [ ] Past major surgery (longer than 45 minutes) within past month  [2 points]  [x ] Planned major surgery lasting more than 45 minutes (includes laparoscopic and arthroscopic; do not add to the "5" for hip and knee replacement)    [2 points]  [x ] Length of surgery over 2 hours (includes anesthesia time; do not add to the "5" for hip and knee replacement)   [1 point]  [ ] Elective hip or knee joint replacement surgery         [5 points]                                               TRAUMA RELATED RISK FACTORS  [ ] Fracture of the hip, pelvis, or leg                       [5 points]  [ ] Spinal cord injury resulting in paralysis (within the past month)    [5 points]  [ ] Paralysis  (within the past month)                      [5 points]  [ ] Multiple trauma (within the past month)         [5 Points]    Total Score [    7    ]    Total hip and total knee replacement:  Caprini score 9 or less: LOW risk  Caprini score 10 or highter: HIGH RISK    Caprini score 0-2: Low Risk, NO VTE prophylaxis required for most patients, encourage ambulation  Caprini score 3-6: Moderate Risk , pharmacologic VTE prophylaxis is indicated for most patients (in the absence of contraindications)  Caprini score 7 or higher: High risk, pharmocologic VTE prophylaxis indicated for most patients (in the absence of contraindications)          IMPRESSION:  this is a 70 yo female with pmh: obesity, Gibert's syndrome, diverticulitis, hiatal hernia, pre-dm, HTN, HLD, right ear acoustic neuroma, surgery in 2019, anxiety/depression and Crohn's disease. Per pt she has tried numerous biologics without success and is now affecting her quality of life.  She recently started Entyvio infusions every 6 weeks, which seems to be working.  She has followed with Dr Ramirez, imaging + for sigmoid colon stricture.  Pt is seen on 2 [place] S/[place] hand assisted lap sigmoidectomy. 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     pain control with tylenol/oxycodone  IVF's  wound care per CRS  Monitor I& O  Monitor Cbc, BMP  BGM per CRS protocol  diet per CRS  enterag d/c'd d/t multi stools    #Hypophosphatemia  replete    #HTN  cont amlodipine    #HLD  continue statin    #Anxiety/depression  cont sertraline    #Pre-dm/hyperglycemia  bgm/ss    # Crohn's disease  f/u with surgery /GI for further Entyvio infusions    # Obesity   lifestyle/dietary modifications    #VTE prophylaxis  lovenox  Venody[place]  Amb    dispo: home per CRS          
  PCP:  Dr. Johanny Bishop    CHIEF COMPLAINT: crohn's disease    HISTORY OF THE PRESENT ILLNESS: this is a 70 yo female with pmh: obesity, Gibert's syndrome, diverticulitis, hiatal hernia, pre-dm, HTN, HLD, right ear acoustic neuroma, surgery in 2019, anxiety/depression and Crohn's disease/  Per pt she has tried numerous biologics without success and is now affecting her quality of life.  She recently started Entyvio infusions every 6 weeks, which seems to be working.  She has followed with Dr Ramirez, imaging + for sigmoid colon stricture.  Pt is seen on 2 [place] S/P hand assisted lap sigmoidectomy. 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   We are consulted for medical management     Pt is seen up OOB, eating breakfast, no further bloody stools, feels better, [place] po, no n/v, denies cp or sob      REVIEW OF SYSTEMS:   All 10 systems reviewed in detailed and found to be negative with the exception of what has already been described above    Vital Signs Last 24 Hrs  T(C): 37 (24 @ 08:20), Max: 37 (24 @ 00:06)  T(F): 98.6 (24 @ 08:20), Max: 98.6 (24 @ 00:06)  HR: 85 (24 @ 08:20) (69 - 85)  BP: 141/74 (24 @ 08:20) (126/64 - 141/74)  BP(mean): 83 (24 @ 16:04) (83 - 83)  RR: 18 (24 @ 08:20) (16 - 18)  SpO2: 93% (24 @ 08:20) (93% - 98%)    MEDICATIONS  (STANDING):  amLODIPine   Tablet 5 milliGRAM(s) Oral at bedtime  enoxaparin Injectable 40 milliGRAM(s) SubCutaneous every 24 hours  multivitamin 1 Tablet(s) Oral daily  potassium phosphate / sodium phosphate Tablet (K-PHOS No. 2) 1 Tablet(s) Oral four times a day with meals  rosuvastatin 5 milliGRAM(s) Oral at bedtime  sertraline 50 milliGRAM(s) Oral at bedtime  sodium chloride 0.9% lock flush 3 milliLiter(s) IV Push every 8 hours    MEDICATIONS  (PRN):  acetaminophen     Tablet .. 650 milliGRAM(s) Oral every 6 hours PRN Temp greater or equal to 38C (100.4F), Mild Pain (1 - 3)  ondansetron Injectable 4 milliGRAM(s) IV Push every 6 hours PRN Nausea and/or Vomiting  oxyCODONE    IR 5 milliGRAM(s) Oral every 4 hours PRN Moderate Pain (4 - 6)  oxyCODONE    IR 10 milliGRAM(s) Oral every 4 hours PRN Severe Pain (7 - 10)    PHYSICAL EXAM:    GENERAL: Comfortable, no acute distress   HEAD:  Normocephalic, atraumatic  EYES: EOMI, PERRLA  HEENT: Moist mucous membranes  NECK: Supple, No JVD  NERVOUS SYSTEM:  Alert & Oriented X3, Motor Strength 5/5 B/L upper and lower extremities  CHEST/LUNG: Clear to auscultation bilaterally  HEART: Regular rate and rhythm  ABDOMEN: Soft, obese, + post op tenderness, Bowel sounds minimal, no Bowel function, NP seal intact, lap sites c/d/i  GENITOURINARY: voiding  EXTREMITIES:   No clubbing, cyanosis, or edema  MUSCULOSKELETAL- No muscle tenderness, no joint tenderness  SKIN-no rash      LABS:                                                            11.9   8.03  )-----------( 228      ( 2024 09:03 )             35.1       11-14    142  |  109[H]  |  12  ----------------------------<  101[H]  3.5   |  27  |  0.57    Ca    9.1      2024 09:03  Phos  2.4     11-14  Mg     1.7     11-14    CAPRINI SCORE Version 2013    AGE RELATED RISK FACTORS                                                             [ ] Age 41-60 years             [1 point]  [ x] Age: 61-74 years            [2 points]                 [ ] Age 75 years or over     [3 points]             DISEASE RELATED RISK FACTORS                                                       [ ] Current swollen legs (pitting edema of any level)     [1 point]                     [ ] Varicose veins (visible, bulging vein, not spider veins or surgically removed veins)   [1 point]                                 [x ] BMI > 25 Kg/m2                       [1 point]  [ ] BMI > 40 kg/m2                       [1 point]                                 [ ] Serious infection (within past month, requiring hospitalization and IV antibiotics)    [1 point]                     [ ] Lung disease ( interstitial: COPD, emphysema, sarcoid, 9-11 illness, NOT asthma)       [1 point]                                                                          [ ] Acute myocardial infarction (within past month)      [1 point]                  [ ] Congestive heart failure (episode within past month or taking CHF meds)                   [1 point]         [ x] Inflammatory bowel disease (Crohns disease or ulcerative colitis, not irritable bowel syndrome)   [1 point]                  [ ] Central venous access, PICC line or Port (within past month)     [2 points]                                                             [ ] Stroke (within past month)     [5 points]    [ ] Previous or present malignancy (includes melanoma but not basal cell carcinoma, each incidence of cancer scores 2 points-not metastases)  [2 points]                                                                                                                                                         HEMATOLOGY RELATED FACTORS                                                         [ ] History of DVT or PE (including superficial venous thrombosis)    [3 points]                    [ ] Positive family history for DVT or PE (includes first-, second-, and third-degree relatives)   [3 points]   [ ] Personal or family history of genetic thrombophilia (family history counts only if it has not been confirmed that patient does not have this genetic marker)                       [ ] Prothrombin 24246P mutation                   [3 points]                           [ ] Factor V Leiden                                               [3 points]            [ ] Antithrombin III deficiency                           [3 points]         [ ] Protein C & S deficiency                                [3 points]              [ ] Dysfibrinogenemia                                         [3 points]  [ ] Personal history of acquired thrombophilia                       [ ] Lupus anticoagulant                                       [3 points]                                                                  [ ] Anticardiolipin antibodies                             [3 points]              [ ] Antiphospholipid antibodies                         [3 points]                                                         [ ] High homocysteine in the blood                  [3 points]                                                    [ ] Myeloproliferative disorders (including thrombocytosis)    [3 points]            [ ] HIV                                                                     [3 points]                                                    [ ] Heparin induced thrombocytopenia            [3 points]                                        MOBILITY RELATED FACTORS  [ ] Bed rest or restricted mobility (inability to ambulate 30 feet continuously or removable leg brace) for less than 72 hours    [1 point]  [ ] Nonremovable plaster cast or mold that prevents calf muscle use   [2 points]  [ ] Bed bound  or restricted mobility for more than 72 hours                  [2 points]    GENDER SPECIFIC FACTORS  [ ] Pregnancy or had a baby within the last month   [1 point]  [ ] Hormone therapy  or oral contraception               [1 point]  [ ] Current use of estrogen-like drugs (raloxifine, tamoxifen, anastrozole, letrozole)                                [1 point]  [ ] History of unexplained stillborn infant, premature birth with toxemia or growth-restricted infant   [1 point]  [ ] Recurrent spontaneous abortions (3 or more)      [1 point]    OTHER RISK FACTORS                                         [ ] Current smoker (includes vaping and smoking marijuana)      [1 point]  [ ] Diabetes requiring insulin     [1 point]                   [ ] Chemotherapy (includes methotrexate for rheumatoid arthritis, hydroxyurea for thrombocytosis)    [1 point]  [ ] Blood transfusion(s)               [1 point]    SURGERY RELATED RISK FACTORS  [ ]  section within the last month                    [1 point]  [ ] Minor surgery is planned (less than 45 minutes)     [1 point]  [ ] Past major surgery (longer than 45 minutes) within past month  [2 points]  [x ] Planned major surgery lasting more than 45 minutes (includes laparoscopic and arthroscopic; do not add to the "5" for hip and knee replacement)    [2 points]  [x ] Length of surgery over 2 hours (includes anesthesia time; do not add to the "5" for hip and knee replacement)   [1 point]  [ ] Elective hip or knee joint replacement surgery         [5 points]                                               TRAUMA RELATED RISK FACTORS  [ ] Fracture of the hip, pelvis, or leg                       [5 points]  [ ] Spinal cord injury resulting in paralysis (within the past month)    [5 points]  [ ] Paralysis  (within the past month)                      [5 points]  [ ] Multiple trauma (within the past month)         [5 Points]    Total Score [    7    ]    Total hip and total knee replacement:  Caprini score 9 or less: LOW risk  Caprini score 10 or highter: HIGH RISK    Caprini score 0-2: Low Risk, NO VTE prophylaxis required for most patients, encourage ambulation  Caprini score 3-6: Moderate Risk , pharmacologic VTE prophylaxis is indicated for most patients (in the absence of contraindications)  Caprini score 7 or higher: High risk, pharmocologic VTE prophylaxis indicated for most patients (in the absence of contraindications)          IMPRESSION:  this is a 70 yo female with pmh: obesity, Gibert's syndrome, diverticulitis, hiatal hernia, pre-dm, HTN, HLD, right ear acoustic neuroma, surgery in 2019, anxiety/depression and Crohn's disease. Per pt she has tried numerous biologics without success and is now affecting her quality of life.  She recently started Entyvio infusions every 6 weeks, which seems to be working.  She has followed with Dr Ramirez, imaging + for sigmoid colon stricture.  Pt is seen on 2 [place] S/P hand assisted lap sigmoidectomy. 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     pain control with tylenol/oxycodone  wound care per CRS  Monitor I& O  Monitor Cbc, BMP  BGM per CRS protocol  diet per CRS      #Hypophosphatemia  resolved    #HTN  cont amlodipine    #HLD  continue statin    #Anxiety/depression  cont sertraline    #Pre-dm/hyperglycemia  bgm/ss    # Crohn's disease  f/u with surgery /GI for further Entyvio infusions    # Obesity   lifestyle/dietary modifications    #VTE prophylaxis  lovenox  [place]  Amb    dispo: home today          
Patient seen and examined by surgical team this morning.   Underwent Lap sigmoid with small bowel fistula takedown yesterday.  Feeling well. no BF yet  No acute overnight events  Pain well controlled  Denies fever or chills  Tolerating diet well without nausea or vomiting.       ROS: Negative except written above    PHYSICAL EXAM:  - GENERAL: No acute distress.  - EYES: EOMI. Anicteric.  - HENT: Moist mucous membranes. No scleral icterus. No cervical lymphadenopathy.  - LUNGS:  No accessory muscle use.  - CARDIOVASCULAR: Regular rate and rhythm.   - ABDOMEN: Soft, appropriately-tender and non-distended. all incision c/d/i No palpable masses.  - EXTREMITIES: No edema. Non-tender.  - SKIN: No rashes or lesions. Warm.  - NEUROLOGIC: No focal neurological deficits. CN II-XII grossly intact, but not individually tested.  - PSYCHIATRIC: Cooperative. Appropriate mood and affect.      Chief complaint:      PMHx:  Osteochondroma    Right acoustic neuroma    HTN (hypertension)    Hyperlipidemia    Crohn's disease    Diverticulitis    Trigger finger, right    Anxiety and depression    Skin cancer, basal cell    Deafness in right ear    Lung nodule    Arthritis    Gilbert syndrome    Prediabetes    Hiatal hernia    Renal cyst, right    Left ovarian cyst    Stricture of sigmoid colon        PSHx:  H/O left knee surgery    S/P excision of acoustic neuroma    History of dilatation and curettage    H/O colonoscopy    History of esophagogastroduodenoscopy (EGD)        FHx:  Family history of peritoneal cancer (Mother)    Family history of CVA (Father)    FH: diabetes mellitus    FH: HTN (hypertension)    FH: heart attack (Father)    FH: Crohn's disease        Vitals:  T(C): 36.6 (11-12 @ 08:00), Max: 36.9 (11-12 @ 00:13)  HR: 66 (11-12 @ 08:00) (65 - 92)  BP: 112/52 (11-12 @ 08:00) (105/58 - 151/73)  RR: 18 (11-12 @ 08:00) (12 - 19)  SpO2: 100% (11-12 @ 08:00) (94% - 100%)      I&Os    11-11 @ 07:01  -  11-12 @ 07:00  --------------------------------------------------------  IN:    Other (mL): 2000 mL  Total IN: 2000 mL    OUT:    Other (mL): 100 mL    Ureteral Catheter (mL): 425 mL  Total OUT: 525 mL    Total NET: 1475 mL        .    Labs:  11-12 @ 09:06                    11.9  CBC: 8.65>)-------(<234                     35.1                 - | - | -    CMP:  ----------------------< -               - | - | -                      Ca:-  Phos:-  Mg:-               -|      |-        LFTs:  ------|-|-----             -|      |-        Cultures:        Current Inpatient Medications:  acetaminophen     Tablet .. 650 milliGRAM(s) Oral every 6 hours PRN  acetaminophen   IVPB .. 1000 milliGRAM(s) IV Intermittent every 6 hours  alvimopan 12 milliGRAM(s) Oral every 12 hours  amLODIPine   Tablet 5 milliGRAM(s) Oral at bedtime  enoxaparin Injectable 40 milliGRAM(s) SubCutaneous every 24 hours  lactated ringers. 1000 milliLiter(s) (125 mL/Hr) IV Continuous <Continuous>  multivitamin 1 Tablet(s) Oral daily  ondansetron Injectable 4 milliGRAM(s) IV Push every 6 hours PRN  oxyCODONE    IR 5 milliGRAM(s) Oral every 4 hours PRN  oxyCODONE    IR 10 milliGRAM(s) Oral every 4 hours PRN  rosuvastatin 5 milliGRAM(s) Oral at bedtime  sertraline 50 milliGRAM(s) Oral at bedtime  sodium chloride 0.9% lock flush 3 milliLiter(s) IV Push every 8 hours      
Patient seen and examined at the bedside this AM.  BBM have improved overnight. Ambulating, tolerating diet.       PAST MEDICAL & SURGICAL HISTORY:  Osteochondroma  left knee--benign      Right acoustic neuroma      HTN (hypertension)      Hyperlipidemia      Crohn's disease      Diverticulitis      Trigger finger, right      Anxiety and depression      Skin cancer, basal cell      Deafness in right ear      Lung nodule      Arthritis      Gilbert syndrome      Prediabetes      Hiatal hernia      Renal cyst, right      Left ovarian cyst      Stricture of sigmoid colon      H/O left knee surgery  1975      S/P excision of acoustic neuroma  Right---9/6/2019; Revision post op  wound infection 11/2019      History of dilatation and curettage      H/O colonoscopy  multiple      History of esophagogastroduodenoscopy (EGD)          MEDICATIONS  (STANDING):  amLODIPine   Tablet 5 milliGRAM(s) Oral at bedtime  enoxaparin Injectable 40 milliGRAM(s) SubCutaneous every 24 hours  multivitamin 1 Tablet(s) Oral daily  potassium phosphate / sodium phosphate Tablet (K-PHOS No. 2) 1 Tablet(s) Oral four times a day with meals  rosuvastatin 5 milliGRAM(s) Oral at bedtime  sertraline 50 milliGRAM(s) Oral at bedtime  sodium chloride 0.9% lock flush 3 milliLiter(s) IV Push every 8 hours    MEDICATIONS  (PRN):  acetaminophen     Tablet .. 650 milliGRAM(s) Oral every 6 hours PRN Temp greater or equal to 38C (100.4F), Mild Pain (1 - 3)  ondansetron Injectable 4 milliGRAM(s) IV Push every 6 hours PRN Nausea and/or Vomiting  oxyCODONE    IR 5 milliGRAM(s) Oral every 4 hours PRN Moderate Pain (4 - 6)  oxyCODONE    IR 10 milliGRAM(s) Oral every 4 hours PRN Severe Pain (7 - 10)      Allergies    vancomycin (Hives; Rash)  Levaquin (Other)  atorvastatin (Other)    Intolerances        SOCIAL HISTORY:    FAMILY HISTORY:  Family history of peritoneal cancer (Mother)    Family history of CVA (Father)    FH: diabetes mellitus    FH: HTN (hypertension)    FH: heart attack (Father)    FH: Crohn's disease      PHYSICAL EXAM:  - GENERAL: No acute distress.  - EYES: EOMI. Anicteric.  - HENT: Moist mucous membranes. No scleral icterus. No cervical lymphadenopathy.  - LUNGS:  No accessory muscle use.  - CARDIOVASCULAR: Regular rate and rhythm.   - ABDOMEN: Soft, appropriately-tender and non-distended. all incision c/d/i No palpable masses.  - EXTREMITIES: No edema. Non-tender.  - SKIN: No rashes or lesions. Warm.  - NEUROLOGIC: No focal neurological deficits. CN II-XII grossly intact, but not individually tested.  - PSYCHIATRIC: Cooperative. Appropriate mood and affect.        Vital Signs Last 24 Hrs  T(C): 37 (14 Nov 2024 00:06), Max: 37 (14 Nov 2024 00:06)  T(F): 98.6 (14 Nov 2024 00:06), Max: 98.6 (14 Nov 2024 00:06)  HR: 72 (14 Nov 2024 00:06) (66 - 72)  BP: 135/69 (14 Nov 2024 00:06) (123/74 - 135/69)  BP(mean): 83 (13 Nov 2024 16:04) (83 - 83)  RR: 16 (14 Nov 2024 00:06) (16 - 18)  SpO2: 97% (14 Nov 2024 00:06) (95% - 98%)    Parameters below as of 14 Nov 2024 00:06  Patient On (Oxygen Delivery Method): room air        I&O's Summary    12 Nov 2024 07:01  -  13 Nov 2024 07:00  --------------------------------------------------------  IN: 0 mL / OUT: 2050 mL / NET: -2050 mL    13 Nov 2024 07:01  -  14 Nov 2024 02:57  --------------------------------------------------------  IN: 1070 mL / OUT: 3 mL / NET: 1067 mL            LABS:                        12.0   9.52  )-----------( 227      ( 13 Nov 2024 13:47 )             35.4     11-13    143  |  109[H]  |  13  ----------------------------<  90  3.5   |  30  |  0.76    Ca    9.1      13 Nov 2024 08:23  Phos  1.5     11-13  Mg     1.8     11-13        Urinalysis Basic - ( 13 Nov 2024 08:23 )    Color: x / Appearance: x / SG: x / pH: x  Gluc: 90 mg/dL / Ketone: x  / Bili: x / Urobili: x   Blood: x / Protein: x / Nitrite: x   Leuk Esterase: x / RBC: x / WBC x   Sq Epi: x / Non Sq Epi: x / Bacteria: x      CAPILLARY BLOOD GLUCOSE      POCT Blood Glucose.: 97 mg/dL (13 Nov 2024 06:49)        Cultures:      RADIOLOGY & ADDITIONAL STUDIES:        
Patient seen and examined by surgical team this morning.   Feeling well. voiding freely,   liquid bloody BM, tolerated diet,   Pain well controlled  Denies fever or chills  Tolerating diet well without nausea or vomiting.       Obj:    PHYSICAL EXAM:  - GENERAL: No acute distress.  - EYES: EOMI. Anicteric.  - HENT: Moist mucous membranes. No scleral icterus. No cervical lymphadenopathy.  - LUNGS:  No accessory muscle use.  - CARDIOVASCULAR: Regular rate and rhythm.   - ABDOMEN: Soft, appropriately-tender and non-distended. all incision c/d/i No palpable masses.  - EXTREMITIES: No edema. Non-tender.  - SKIN: No rashes or lesions. Warm.  - NEUROLOGIC: No focal neurological deficits. CN II-XII grossly intact, but not individually tested.  - PSYCHIATRIC: Cooperative. Appropriate mood and affect.          Vitals:  T(C): 36.6 ( @ 08:00), Max: 36.9 ( @ 12:00)  HR: 66 (:00) (66 - 74)  BP: 131/60 ( @ 08:00) (114/46 - 131/60)  RR: 16 ( 08:00) (16 - 18)  SpO2: 95% ( 08:00) (94% - 96%)     @ 07:  -   @ 07:00  --------------------------------------------------------  IN:  Total IN: 0 mL    OUT:    Stool (mL): 100 mL    Ureteral Catheter (mL): 700 mL    Voided (mL): 1250 mL  Total OUT: 2050 mL    Total NET: -2050 mL       @ 07:01  -   @ 11:14  --------------------------------------------------------  IN:    IV PiggyBack: 100 mL  Total IN: 100 mL    OUT:  Total OUT: 0 mL    Total NET: 100 mL           @ 08:23                    12.0  CBC: 9.21>)-------(<244                     35.8                 143 | 109 | 13    CMP:  ----------------------< 90               3.5 | 30 | 0.76                      Ca:9.1  Phos:1.5  M.8               -|      |-        LFTs:  ------|-|-----             -|      |-  11 @ 09:06                    11.9  CBC: 8.65>)-------(<234                     35.1                 137 | 106 | 15    CMP:  ----------------------< 132               3.5 | 25 | 1.00                      Ca:9.0  Phos:3.1  M.7               -|      |-        LFTs:  ------|-|-----             -|      |-      Current Inpatient Medications:  acetaminophen     Tablet .. 650 milliGRAM(s) Oral every 6 hours PRN  amLODIPine   Tablet 5 milliGRAM(s) Oral at bedtime  enoxaparin Injectable 40 milliGRAM(s) SubCutaneous every 24 hours  multivitamin 1 Tablet(s) Oral daily  ondansetron Injectable 4 milliGRAM(s) IV Push every 6 hours PRN  oxyCODONE    IR 5 milliGRAM(s) Oral every 4 hours PRN  oxyCODONE    IR 10 milliGRAM(s) Oral every 4 hours PRN  potassium phosphate / sodium phosphate Tablet (K-PHOS No. 2) 1 Tablet(s) Oral every 4 hours  rosuvastatin 5 milliGRAM(s) Oral at bedtime  sertraline 50 milliGRAM(s) Oral at bedtime  sodium chloride 0.9% lock flush 3 milliLiter(s) IV Push every 8 hours

## 2024-11-14 NOTE — DISCHARGE NOTE NURSING/CASE MANAGEMENT/SOCIAL WORK - PATIENT PORTAL LINK FT
You can access the FollowMyHealth Patient Portal offered by [facility] by registering at the following website: [email or web address]. By joining [employer]’s FollowMyHealth portal, you will also be able to view your health information using other applications (apps) compatible with our system.

## 2024-11-14 NOTE — PROGRESS NOTE ADULT - ATTENDING COMMENTS
Patient seen and examined at bedside this morning  She reports doing well overall, reported blood bowel movements overnight which have decreased  Abdomen is soft, ND, AT, minor bruising  A/P: Continue with diet as tolerated, repeat Hgb to trend, follow up bowel movement quality, continue supportive care during recovery. Plan for discharge 11/14    Vital Signs Last 24 Hrs  T(C): 36.6 (13 Nov 2024 08:00), Max: 36.9 (12 Nov 2024 20:00)  T(F): 97.9 (13 Nov 2024 08:00), Max: 98.4 (12 Nov 2024 20:00)  HR: 66 (13 Nov 2024 08:00) (66 - 74)  BP: 131/60 (13 Nov 2024 08:00) (114/46 - 131/60)  BP(mean): --  RR: 16 (13 Nov 2024 08:00) (16 - 18)  SpO2: 95% (13 Nov 2024 08:00) (94% - 96%)    Parameters below as of 13 Nov 2024 08:00  Patient On (Oxygen Delivery Method): room air                          12.0   9.52  )-----------( 227      ( 13 Nov 2024 13:47 )             35.4
SE  As above  D/C IV  D/C home
Patient seen and examined. She is POD 1 from sigmoidectomy for fistulizing crohns disease. She is doing well. Pain well controlled. On exam, abdomen is soft, non distended and appropriately tender. Incision with dressing. Diet as tolerated and await return of bowel function. Ambulate and IS.

## 2024-11-15 ENCOUNTER — NON-APPOINTMENT (OUTPATIENT)
Age: 69
End: 2024-11-15

## 2024-11-15 PROBLEM — I10 ESSENTIAL (PRIMARY) HYPERTENSION: Chronic | Status: ACTIVE | Noted: 2024-11-05

## 2024-11-15 PROBLEM — M65.30 TRIGGER FINGER, UNSPECIFIED FINGER: Chronic | Status: ACTIVE | Noted: 2024-11-05

## 2024-11-15 PROBLEM — K50.90 CROHN'S DISEASE, UNSPECIFIED, WITHOUT COMPLICATIONS: Chronic | Status: ACTIVE | Noted: 2024-11-05

## 2024-11-15 PROBLEM — C44.91 BASAL CELL CARCINOMA OF SKIN, UNSPECIFIED: Chronic | Status: ACTIVE | Noted: 2024-11-05

## 2024-11-15 PROBLEM — K44.9 DIAPHRAGMATIC HERNIA WITHOUT OBSTRUCTION OR GANGRENE: Chronic | Status: ACTIVE | Noted: 2024-11-05

## 2024-11-15 PROBLEM — R91.1 SOLITARY PULMONARY NODULE: Chronic | Status: ACTIVE | Noted: 2024-11-05

## 2024-11-15 PROBLEM — N83.202 UNSPECIFIED OVARIAN CYST, LEFT SIDE: Chronic | Status: ACTIVE | Noted: 2024-11-05

## 2024-11-15 PROBLEM — H91.91 UNSPECIFIED HEARING LOSS, RIGHT EAR: Chronic | Status: ACTIVE | Noted: 2024-11-05

## 2024-11-15 PROBLEM — K56.699 OTHER INTESTINAL OBSTRUCTION UNSPECIFIED AS TO PARTIAL VERSUS COMPLETE OBSTRUCTION: Chronic | Status: ACTIVE | Noted: 2024-11-05

## 2024-11-15 PROBLEM — M19.90 UNSPECIFIED OSTEOARTHRITIS, UNSPECIFIED SITE: Chronic | Status: ACTIVE | Noted: 2024-11-05

## 2024-11-15 PROBLEM — K57.92 DIVERTICULITIS OF INTESTINE, PART UNSPECIFIED, WITHOUT PERFORATION OR ABSCESS WITHOUT BLEEDING: Chronic | Status: ACTIVE | Noted: 2024-11-05

## 2024-11-15 PROBLEM — E78.5 HYPERLIPIDEMIA, UNSPECIFIED: Chronic | Status: ACTIVE | Noted: 2024-11-05

## 2024-11-15 PROBLEM — N28.1 CYST OF KIDNEY, ACQUIRED: Chronic | Status: ACTIVE | Noted: 2024-11-05

## 2024-11-15 PROBLEM — E80.4 GILBERT SYNDROME: Chronic | Status: ACTIVE | Noted: 2024-11-05

## 2024-11-15 PROBLEM — D33.3 BENIGN NEOPLASM OF CRANIAL NERVES: Chronic | Status: ACTIVE | Noted: 2024-11-05

## 2024-11-15 PROBLEM — D16.9 BENIGN NEOPLASM OF BONE AND ARTICULAR CARTILAGE, UNSPECIFIED: Chronic | Status: ACTIVE | Noted: 2024-11-05

## 2024-11-15 PROBLEM — R73.03 PREDIABETES: Chronic | Status: ACTIVE | Noted: 2024-11-05

## 2024-11-15 LAB — SURGICAL PATHOLOGY STUDY: SIGNIFICANT CHANGE UP

## 2024-11-20 ENCOUNTER — NON-APPOINTMENT (OUTPATIENT)
Age: 69
End: 2024-11-20

## 2024-11-20 NOTE — CDI QUERY NOTE - NSCDIOTHERTXTBX_GEN_ALL_CORE_HH
Clinical documentation and/or evidence in the medical record indicates that this patient has a pathology report finding without an associated diagnosis. In order to ensure accurate coding and accuracy of the clinical record, the documentation in this patient’s medical record requires additional clarification.  Please include documentation of a diagnosis associated with these findings in your progress note and/or discharge summary.  Please clarify if the patient was found to have one of the following:      -Yes, patient has - Acute perforated diverticulitis with associated acute serositis and fistula tract. - Chronic active colitis, extensive, "pseudopolyps."   -No, disagree with pathology findings- Acute perforated diverticulitis with associated acute serositis and fistula tract. - Chronic active colitis, extensive, "pseudopolyps."   -Other (specify)    Supporting documentation and/or clinical evidence:      Pathology Report Impression: 1. Colonic segment (sigmoid): - Acute perforated diverticulitis withassociated acute serositis and fistula tract. - Chronic active colitis, extensive, consistent with inflammatory bowel disease. See comment. - Additional polypoid regions of colonic mucosa consistent with   "pseudopolyps." - Four reactive pericolonic lymph nodes (0/4). 2. Colonic segments (colorectal anastomosis): - Regions of chronic active colitis consistent with inflammatory bowel disease.   It is difficult to determine the degree to which the serosal and pericolonic inflammatory changes are related to a dominant perforated diverticulum versus a secondary effect of the inflammatory bowel disease.   Clinical Information Crohns disease, colon stricture and fistula, morbid obesity   Gross Description 1. Received fresh labeled "sigmoid colon with extensive Crohn's disease and fistulous" is a 13 cm long by 2 cm to 2.3 cm diameter unoriented segment of colon with one and stapled closed and the opposite end open. There is an abundant amount of focally hemorrhagic mesenteric fat with a maximum width of 5 cm. The colon serosa is pink-tan and smooth with scant hemorrhagic and membranous adhesions. The wallthickness ranges from 0.5 cm to 1.2 cm. The mucosa is tan, focally hemorrhagic, smooth with enlarged folds and focally polypoid. On cut section, an abscess/fistula tract that bifurcates is identified measuring 5.7 cm long and is 1.5 cm from thestapled end. Multiple rubbery pink-tan lymph nodes identified within the mesenteric fat.   Representative sections in 8 cassettes: 1A-1G: Representative sections submitted sequentially from stapled end to open end, bisected section in cassettes 1D-1E 1H: Representative sections of lymph nodes   2. Received: In formalin labeled "colorectal anastomosis" Size: Two, 2 cm in greatest dimensions each Description: Pink-tan, congested, slightly nodular, and annular Cut Surface: Tan, soft, onefragment with numerous staples and sutures Anvil: Present with both fragments attached   Additional Comments: Separate blood clot is identified within the container.       Other clinical indicators (if applicable): brief op report-Operative Findings thickened sigmoid colon with crohns disease, entero-colonic and colo-colonic fistula. sigmoid also adherent to left adnexa.Evidence of infection or abscess identified at the start or during the surgical procedure: No  Xray Barium Enema Single Contrast (10.02.24 @ 10:47) >IMPRESSION: Severe short segment mid sigmoid colon stricture with multiple associated fistulous tracts. Clinical documentation and/or evidence in the medical record indicates that this patient has a pathology report finding without an associated diagnosis. In order to ensure accurate coding and accuracy of the clinical record, the documentation in this patient’s medical record requires additional clarification.  Please include documentation of a diagnosis associated with these findings in your progress note and/or discharge summary.  Please clarify if the patient was found to have one of the following:      -Yes, patient has - Acute perforated diverticulitis with associated acute serositis and fistula tract. - Chronic active colitis, extensive, "pseudopolyps."   -No, disagree with pathology findings- Acute perforated diverticulitis with associated acute serositis and fistula tract. - Chronic active colitis, extensive, "pseudopolyps."   -Other (specify)    Supporting documentation and/or clinical evidence:      Pathology Report Impression: 1. Colonic segment (sigmoid): - Acute perforated diverticulitis withassociated acute serositis and fistula tract. - Chronic active colitis, extensive, consistent with inflammatory bowel disease. See comment. - Additional polypoid regions of colonic mucosa consistent with   "pseudopolyps." - Four reactive pericolonic lymph nodes (0/4). 2. Colonic segments (colorectal anastomosis): - Regions of chronic active colitis consistent with inflammatory bowel disease.   It is difficult to determine the degree to which the serosal and pericolonic inflammatory changes are related to a dominant perforated diverticulum versus a secondary effect of the inflammatory bowel disease.   Clinical Information Crohns disease, colon stricture and fistula, morbid obesity   Gross Description 1. Received fresh labeled "sigmoid colon with extensive Crohn's disease and fistulous" is a 13 cm long by 2 cm to 2.3 cm diameter unoriented segment of colon with one and stapled closed and the opposite end open. There is an abundant amount of focally hemorrhagic mesenteric fat with a maximum width of 5 cm. The colon serosa is pink-tan and smooth with scant hemorrhagic and membranous adhesions. The wallthickness ranges from 0.5 cm to 1.2 cm. The mucosa is tan, focally hemorrhagic, smooth with enlarged folds and focally polypoid. On cut section, an abscess/fistula tract that bifurcates is identified measuring 5.7 cm long and is 1.5 cm from thestapled end. Multiple rubbery pink-tan lymph nodes identified within the mesenteric fat.   Representative sections in 8 cassettes: 1A-1G: Representative sections submitted sequentially from stapled end to open end, bisected section in cassettes 1D-1E 1H: Representative sections of lymph nodes   2. Received: In formalin labeled "colorectal anastomosis" Size: Two, 2 cm in greatest dimensions each Description: Pink-tan, congested, slightly nodular, and annular Cut Surface: Tan, soft, onefragment with numerous staples and sutures Anvil: Present with both fragments attached   Additional Comments: Separate blood clot is identified within the container.

## 2024-11-20 NOTE — CDI QUERY NOTE - NSCDIOTHERTXTBX3_GEN_ALL_CORE_FT
The pathology report  documents-" Acute perforated diverticulitis with associated acute serositis and fistula tract, and description  an abscess/fistula tract " and the xray documents: "multiple associated fistulous tracts". Op report documents "repair of entero colonic and colocolonic fistulas"  Could you please further clarify the serositis    -Multiple acute serositis  -Acute serositis  -Other please specify    Chart documentation and clinical evidence    Pathology Report Impression:     1. Colonic segment (sigmoid): - Acute perforated diverticulitis with associated acute serositis and fistula tract. - Chronic active colitis, extensive, consistent with inflammatory bowel disease. See comment. - Additional polypoid regions of colonic mucosa consistent with   "pseudopolyps." - Four reactive pericolonic lymph nodes (0/4). 2. Colonic segments (colorectal anastomosis): - Regions of chronic active colitis consistent with inflammatory bowel disease.   It is difficult to determine the degree to which the serosal and pericolonic inflammatory changes are related to a dominant perforated diverticulum versus a secondary effect of the inflammatory bowel disease.   Clinical Information Crohns disease, colon stricture and fistula, morbid obesity   Gross Description 1. Received fresh labeled "sigmoid colon with extensive Crohn's disease and fistulous" is a 13 cm long by 2 cm to 2.3 cm diameter unoriented segment of colon with one and stapled closed and the opposite end open. There is an abundant amount of focally hemorrhagic mesenteric fat with a maximum width of 5 cm. The colon serosa is pink-tan and smooth with scant hemorrhagic and membranous adhesions. The wallthickness ranges from 0.5 cm to 1.2 cm. The mucosa is tan, focally hemorrhagic, smooth with enlarged folds and focally polypoid. On cut section, an abscess/fistula tract that bifurcates is identified measuring 5.7 cm long and is 1.5 cm from thestapled end. Multiple rubbery pink-tan lymph nodes identified within the mesenteric fat.   Representative sections in 8 cassettes: 1A-1G: Representative sections submitted sequentially from stapled end to open end, bisected section in cassettes 1D-1E 1H: Representative sections of lymph nodes   2. Received: In formalin labeled "colorectal anastomosis" Size: Two, 2 cm in greatest dimensions each Description: Pink-tan, congested, slightly nodular, and annular Cut Surface: Tan, soft, onefragment with numerous staples and sutures Anvil: Present with both fragments attached   Additional Comments: Separate blood clot is identified within the container.       Other clinical indicators (if applicable): brief op report-Operative Findings thickened sigmoid colon with crohns disease, entero-colonic and colo-colonic fistula. sigmoid also adherent to left adnexa.Evidence of infection or abscess identified at the start or during the surgical procedure: No    Xray - Barium Enema Single Contrast (10.02.24 @ 10:47) >IMPRESSION: Severe short segment mid sigmoid colon stricture with multiple associated fistulous tracts.    Cefotetan 2 gm

## 2024-11-20 NOTE — CDI QUERY NOTE - NSCDIOTHERTXTBX2_GEN_ALL_CORE_FT
The pathology report in the description of the specimens documents-" On cut section, an abscess/fistula tract that bifurcates is identified measuring 5.7 cm long and is 1.5 cm from the stapled end"  Could you please further clarify    -abscess and fistula (please document the location intra abdominal, pelvic, other)  -fistula only  -other     Chart documentation and clinical evidence    Pathology report    Clinical Information Crohns disease, colon stricture and fistula, morbid obesity   Gross Description 1. Received fresh labeled "sigmoid colon with extensive Crohn's disease and fistulous" is a 13 cm long by 2 cm to 2.3 cm diameter unoriented segment of colon with one and stapled closed and the opposite end open. There is an abundant amount of focally hemorrhagic mesenteric fat with a maximum width of 5 cm. The colon serosa is pink-tan and smooth with scant hemorrhagic and membranous adhesions. The wallthickness ranges from 0.5 cm to 1.2 cm. The mucosa is tan, focally hemorrhagic, smooth with enlarged folds and focally polypoid. On cut section, an abscess/fistula tract that bifurcates is identified measuring 5.7 cm long and is 1.5 cm from thestapled end. The pathology report in the description of the specimens documents-" On cut section, an abscess/fistula tract that bifurcates is identified measuring 5.7 cm long and is 1.5 cm from the stapled end"  Could you please further clarify    -Abscess and fistulas (please document the location intra abdominal, pelvic, other)  -Fistulas only  -Other please specify    Chart documentation and clinical evidence    Pathology report    Clinical Information Crohns disease, colon stricture and fistula, morbid obesity   Gross Description 1. Received fresh labeled "sigmoid colon with extensive Crohn's disease and fistulous" is a 13 cm long by 2 cm to 2.3 cm diameter unoriented segment of colon with one and stapled closed and the opposite end open. There is an abundant amount of focally hemorrhagic mesenteric fat with a maximum width of 5 cm. The colon serosa is pink-tan and smooth with scant hemorrhagic and membranous adhesions. The wallthickness ranges from 0.5 cm to 1.2 cm. The mucosa is tan, focally hemorrhagic, smooth with enlarged folds and focally polypoid. On cut section, an abscess/fistula tract that bifurcates is identified measuring 5.7 cm long and is 1.5 cm from thestapled end. The pathology report in the description of the specimens documents-" On cut section, an abscess/fistula tract that bifurcates is identified measuring 5.7 cm long and is 1.5 cm from the stapled end"  Could you please further clarify    -Abscess and fistulas (please document the location intra abdominal, pelvic, other)  -Fistulas only  -Other please specify    Chart documentation and clinical evidence    Pathology report    Clinical Information Crohns disease, colon stricture and fistula, morbid obesity   Gross Description 1. Received fresh labeled "sigmoid colon with extensive Crohn's disease and fistulous" is a 13 cm long by 2 cm to 2.3 cm diameter unoriented segment of colon with one and stapled closed and the opposite end open. There is an abundant amount of focally hemorrhagic mesenteric fat with a maximum width of 5 cm. The colon serosa is pink-tan and smooth with scant hemorrhagic and membranous adhesions. The wallthickness ranges from 0.5 cm to 1.2 cm. The mucosa is tan, focally hemorrhagic, smooth with enlarged folds and focally polypoid. On cut section, an abscess/fistula tract that bifurcates is identified measuring 5.7 cm long and is 1.5 cm from the stapled end.        Other clinical indicators (if applicable): brief op report-Operative Findings thickened sigmoid colon with crohns disease, entero-colonic and colo-colonic fistula. sigmoid also adherent to left adnexa.Evidence of infection or abscess identified at the start or during the surgical procedure: No  Xray Barium Enema Single Contrast (10.02.24 @ 10:47) >IMPRESSION: Severe short segment mid sigmoid colon stricture with multiple associated fistulous tracts.

## 2024-11-21 NOTE — CHART NOTE - NSCHARTNOTEFT_GEN_A_CORE
1.-Yes, patient has - Acute perforated diverticulitis with associated acute serositis and fistula tract. - Chronic active colitis, extensive, "pseudopolyps."- agree with pathology report.    Surgical Pathology Report:   ACCESSION No: [id number]   Patient: NAILA NAPOLES   Accession: [id number]   Collected Date/Time: 11/11/2024 14:42 EST   Received Date/Time: 11/11/2024 16:08 EST   Surgical Pathology Report - Auth (Verified)   Specimen(s) Submitted   1 Sigmoid colon with extensive Crohns disease and fistulas   2 Colorectal anastomosis   Final Diagnosis   1. Colonic segment (sigmoid):   - Acute perforated diverticulitis with associated acute serositis   and fistula tract.   - Chronic active colitis, extensive, consistent with inflammatory   bowel disease. See   comment.   - Additional polypoid regions of colonic mucosa consistent with   "pseudopolyps."   - Four reactive pericolonic lymph nodes (0/4).   2. Colonic segments (colorectal anastomosis):   - Regions of chronic active colitis consistent with inflammatory   bowel disease.   Verified by: DEEPTI HALL M.D.   (Electronic Signature)   Reported on: 11/15/24 12:04 EST, [facility], [address] [place] [address],       2.-Abscess and fistulas are intra-abdominal     3.acute serositis

## 2024-11-26 DIAGNOSIS — R73.03 PREDIABETES: ICD-10-CM

## 2024-11-26 DIAGNOSIS — K65.8 OTHER PERITONITIS: ICD-10-CM

## 2024-11-26 DIAGNOSIS — F41.9 ANXIETY DISORDER, UNSPECIFIED: ICD-10-CM

## 2024-11-26 DIAGNOSIS — I10 ESSENTIAL (PRIMARY) HYPERTENSION: ICD-10-CM

## 2024-11-26 DIAGNOSIS — E83.39 OTHER DISORDERS OF PHOSPHORUS METABOLISM: ICD-10-CM

## 2024-11-26 DIAGNOSIS — E66.01 MORBID (SEVERE) OBESITY DUE TO EXCESS CALORIES: ICD-10-CM

## 2024-11-26 DIAGNOSIS — Z87.891 PERSONAL HISTORY OF NICOTINE DEPENDENCE: ICD-10-CM

## 2024-11-26 DIAGNOSIS — L42 PITYRIASIS ROSEA: ICD-10-CM

## 2024-11-26 DIAGNOSIS — E80.4 GILBERT SYNDROME: ICD-10-CM

## 2024-11-26 DIAGNOSIS — K44.9 DIAPHRAGMATIC HERNIA WITHOUT OBSTRUCTION OR GANGRENE: ICD-10-CM

## 2024-11-26 DIAGNOSIS — K65.1 PERITONEAL ABSCESS: ICD-10-CM

## 2024-11-26 DIAGNOSIS — K50.113 CROHN'S DISEASE OF LARGE INTESTINE WITH FISTULA: ICD-10-CM

## 2024-11-26 DIAGNOSIS — F32.A DEPRESSION, UNSPECIFIED: ICD-10-CM

## 2024-11-27 ENCOUNTER — APPOINTMENT (OUTPATIENT)
Dept: COLORECTAL SURGERY | Facility: CLINIC | Age: 69
End: 2024-11-27
Payer: MEDICARE

## 2024-11-27 VITALS
HEIGHT: 67 IN | TEMPERATURE: 98.6 F | BODY MASS INDEX: 33.12 KG/M2 | WEIGHT: 211 LBS | RESPIRATION RATE: 14 BRPM | SYSTOLIC BLOOD PRESSURE: 123 MMHG | DIASTOLIC BLOOD PRESSURE: 77 MMHG | HEART RATE: 97 BPM

## 2024-11-27 DIAGNOSIS — Z09 ENCOUNTER FOR FOLLOW-UP EXAMINATION AFTER COMPLETED TREATMENT FOR CONDITIONS OTHER THAN MALIGNANT NEOPLASM: ICD-10-CM

## 2024-11-27 DIAGNOSIS — K50.111 CROHN'S DISEASE OF LARGE INTESTINE WITH RECTAL BLEEDING: ICD-10-CM

## 2024-11-27 PROCEDURE — 99024 POSTOP FOLLOW-UP VISIT: CPT

## 2024-12-05 ENCOUNTER — NON-APPOINTMENT (OUTPATIENT)
Age: 69
End: 2024-12-05

## 2024-12-05 ENCOUNTER — APPOINTMENT (OUTPATIENT)
Dept: OPHTHALMOLOGY | Facility: CLINIC | Age: 69
End: 2024-12-05
Payer: MEDICARE

## 2024-12-05 PROCEDURE — 92014 COMPRE OPH EXAM EST PT 1/>: CPT

## 2025-03-27 ENCOUNTER — NON-APPOINTMENT (OUTPATIENT)
Age: 70
End: 2025-03-27

## 2025-03-27 ENCOUNTER — APPOINTMENT (OUTPATIENT)
Dept: OPHTHALMOLOGY | Facility: CLINIC | Age: 70
End: 2025-03-27
Payer: MEDICARE

## 2025-03-27 PROCEDURE — 92012 INTRM OPH EXAM EST PATIENT: CPT

## 2025-07-12 NOTE — H&P PST ADULT - LAST CARDIAC ANGIOGRAM/IMAGING
1) Use cream as prescribed    2) Keep foot dry and clean    3) Referral to primary care is placed      Please understand this is a provisional diagnosis that can and may change. The diagnosis that you are discharged with is based on the symptoms you described and the diagnostic information available today. If any new symptoms occur or worsen, you should seek immediate re-evaluation at the nearest ED. If any symptoms persist, please follow-up for reassessment.        Denies